# Patient Record
Sex: FEMALE | Race: WHITE | NOT HISPANIC OR LATINO | Employment: OTHER | URBAN - METROPOLITAN AREA
[De-identification: names, ages, dates, MRNs, and addresses within clinical notes are randomized per-mention and may not be internally consistent; named-entity substitution may affect disease eponyms.]

---

## 2023-10-10 PROBLEM — F33.9 MAJOR DEPRESSION, RECURRENT, CHRONIC (CMS-HCC): Status: ACTIVE | Noted: 2023-10-10

## 2023-10-10 PROBLEM — Q66.6: Status: ACTIVE | Noted: 2023-10-10

## 2023-10-10 PROBLEM — T78.40XA ALLERGIES: Status: ACTIVE | Noted: 2023-10-10

## 2023-10-10 RX ORDER — DIPHENHYDRAMINE HCL 25 MG
2 TABLET ORAL 2 TIMES DAILY
COMMUNITY
Start: 2023-08-07 | End: 2024-03-23 | Stop reason: ENTERED-IN-ERROR

## 2023-10-10 RX ORDER — BUPROPION HYDROCHLORIDE 75 MG/1
TABLET ORAL
COMMUNITY
Start: 2023-10-02 | End: 2024-03-23 | Stop reason: ENTERED-IN-ERROR

## 2023-10-10 RX ORDER — SERTRALINE HYDROCHLORIDE 100 MG/1
TABLET, FILM COATED ORAL
COMMUNITY
End: 2024-03-23 | Stop reason: ENTERED-IN-ERROR

## 2023-10-10 RX ORDER — DONEPEZIL HYDROCHLORIDE 5 MG/1
TABLET, FILM COATED ORAL
COMMUNITY
End: 2023-10-31 | Stop reason: SDUPTHER

## 2023-10-10 RX ORDER — LISINOPRIL 2.5 MG/1
TABLET ORAL
COMMUNITY
End: 2024-03-23 | Stop reason: ENTERED-IN-ERROR

## 2023-10-10 RX ORDER — PANTOPRAZOLE SODIUM 40 MG/1
TABLET, DELAYED RELEASE ORAL
COMMUNITY
End: 2024-03-23 | Stop reason: ENTERED-IN-ERROR

## 2023-10-10 RX ORDER — OMEPRAZOLE 40 MG/1
CAPSULE, DELAYED RELEASE ORAL
COMMUNITY
End: 2024-03-23 | Stop reason: ENTERED-IN-ERROR

## 2023-10-10 RX ORDER — SIMVASTATIN 20 MG/1
TABLET, FILM COATED ORAL
COMMUNITY
End: 2024-03-23 | Stop reason: ENTERED-IN-ERROR

## 2023-10-10 RX ORDER — AZELASTINE 1 MG/ML
SPRAY, METERED NASAL
COMMUNITY
Start: 2023-10-02 | End: 2024-03-23 | Stop reason: ENTERED-IN-ERROR

## 2023-10-11 ENCOUNTER — APPOINTMENT (OUTPATIENT)
Dept: NEUROLOGY | Facility: HOSPITAL | Age: 73
End: 2023-10-11
Payer: COMMERCIAL

## 2023-10-16 ENCOUNTER — OFFICE VISIT (OUTPATIENT)
Dept: PODIATRY | Facility: CLINIC | Age: 73
End: 2023-10-16
Payer: COMMERCIAL

## 2023-10-16 DIAGNOSIS — L90.9 FAT PAD ATROPHY OF FOOT: ICD-10-CM

## 2023-10-16 DIAGNOSIS — M20.42 HAMMERTOE, BILATERAL: Primary | ICD-10-CM

## 2023-10-16 DIAGNOSIS — M20.41 HAMMERTOE, BILATERAL: Primary | ICD-10-CM

## 2023-10-16 DIAGNOSIS — L85.3 XEROSIS CUTIS: ICD-10-CM

## 2023-10-16 PROCEDURE — 1160F RVW MEDS BY RX/DR IN RCRD: CPT | Performed by: PODIATRIST

## 2023-10-16 PROCEDURE — 1159F MED LIST DOCD IN RCRD: CPT | Performed by: PODIATRIST

## 2023-10-16 PROCEDURE — 99202 OFFICE O/P NEW SF 15 MIN: CPT | Performed by: PODIATRIST

## 2023-10-16 NOTE — PROGRESS NOTES
History Of Present Illness  Diomedes Taylor is a 73 y.o. female presenting with chief complaint of: Foot pain.  Patient is an avid walker.  She states that she has pain on the bottom of her feet and has developed painful calluses.  She states that she has had surgery in the past for her feet and has had her toes fused.     Past Medical History  She has no past medical history on file.    Surgical History  She has no past surgical history on file.     Social History  She has no history on file for tobacco use, alcohol use, and drug use.    Family History  No family history on file.     Allergies  Patient has no known allergies.    Medications  Current Outpatient Medications   Medication Sig Dispense Refill    azelastine (Astelin) 137 mcg (0.1 %) nasal spray Patient's comments: 2 times daily      buPROPion (Wellbutrin) 75 mg tablet Patient's comments: 2 times daily      diphenhydrAMINE (Sominex) 25 mg tablet Take 2 tablets (50 mg) by mouth 2 times a day. Patient reported      donepezil (Aricept) 5 mg tablet Patient's comments:    She only takes them when she thinks she needs them      lisinopril (ZestriL) 2.5 mg tablet Patient's comments:    She only takes them when she thinks she needs them      MULTIVITAMIN ORAL COMBINATION OF APPROX 5 VITAMINS      omeprazole (PriLOSEC) 40 mg DR capsule Patient's comments:    She only takes them when she thinks she needs them      pantoprazole (Protonix) 40 mg EC tablet Patient's comments:    She only takes them when she thinks she needs them      sertraline (Zoloft) 100 mg tablet Patient's comments:    She only takes them when she thinks she needs them      simvastatin (Zocor) 20 mg tablet Patient's comments:    She only takes them when she thinks she needs them       No current facility-administered medications for this visit.       Review of Systems    REVIEW OF SYSTEMS  GENERAL:  Negative for malaise, significant weight loss, fever  CARDIOVASCULAR: leg swelling   MUSCULOSKELETAL:   Negative for joint pain or swelling, back pain, and muscle pain.  SKIN:  Negative for lesions, rash, and itching  PSYCH:  Negative for sleep disturbance, mood disorder and recent psychosocial stressors  NEURO: Negative, denies any burning, tingling or numbness     Objective:   Vasc: DP and PT pulses are palpable bilateral.  CFT is less than 3 seconds bilateral.  Skin temperature is warm to cool proximal to distal bilateral.      Neuro:  Light touch is intact to the foot bilateral.  Protective sensation is intact to the foot when tested with the 5.07 SWM bilateral.  There is no clonus noted.  The hallux is downgoing bilateral.      Derm: Nails are normal. Skin is supple with normal texture and turgor noted.  Webspaces are clean, dry and intact bilateral.  There are calluses present some first and second metatarsal heads bilateral.  As well, callus present at the tip of the second toes bilateral..      Ortho: Muscle strength is 5/5 for all pedal groups tested.  Patient has atrophy of the plantar fat pad.  Patient has residual bunion deformity bilateral.  She has plantarflexed second metatarsal head.  She has digital clawing of the second toes bilateral.    Assessment/Plan     Diagnoses and all orders for this visit:  Hammertoe, bilateral  Xerosis cutis  Fat pad atrophy of foot      Patient has callus formation secondary to pedal deformity.  At home, patient can use a pumice stone to file down the callused areas.  She tried Kerasal OTC.       I spent 30 minutes in the professional and overall care of this patient.      Iris Rivsa DPM

## 2023-10-31 ENCOUNTER — OFFICE VISIT (OUTPATIENT)
Dept: NEUROLOGY | Facility: HOSPITAL | Age: 73
End: 2023-10-31
Payer: COMMERCIAL

## 2023-10-31 VITALS
HEIGHT: 64 IN | DIASTOLIC BLOOD PRESSURE: 71 MMHG | BODY MASS INDEX: 22.88 KG/M2 | TEMPERATURE: 97.5 F | RESPIRATION RATE: 18 BRPM | WEIGHT: 134 LBS | HEART RATE: 87 BPM | SYSTOLIC BLOOD PRESSURE: 136 MMHG

## 2023-10-31 DIAGNOSIS — G31.84 MILD COGNITIVE IMPAIRMENT: Primary | ICD-10-CM

## 2023-10-31 PROCEDURE — 1036F TOBACCO NON-USER: CPT | Performed by: PSYCHIATRY & NEUROLOGY

## 2023-10-31 PROCEDURE — 99205 OFFICE O/P NEW HI 60 MIN: CPT | Performed by: PSYCHIATRY & NEUROLOGY

## 2023-10-31 PROCEDURE — 99215 OFFICE O/P EST HI 40 MIN: CPT | Performed by: PSYCHIATRY & NEUROLOGY

## 2023-10-31 PROCEDURE — 99417 PROLNG OP E/M EACH 15 MIN: CPT | Performed by: PSYCHIATRY & NEUROLOGY

## 2023-10-31 PROCEDURE — 1126F AMNT PAIN NOTED NONE PRSNT: CPT | Performed by: PSYCHIATRY & NEUROLOGY

## 2023-10-31 PROCEDURE — 1160F RVW MEDS BY RX/DR IN RCRD: CPT | Performed by: PSYCHIATRY & NEUROLOGY

## 2023-10-31 PROCEDURE — G2112 PRED<=5 MG RA GLU <6M: HCPCS | Performed by: PSYCHIATRY & NEUROLOGY

## 2023-10-31 PROCEDURE — 1159F MED LIST DOCD IN RCRD: CPT | Performed by: PSYCHIATRY & NEUROLOGY

## 2023-10-31 RX ORDER — DONEPEZIL HYDROCHLORIDE 5 MG/1
5 TABLET, FILM COATED ORAL NIGHTLY
Qty: 30 TABLET | Refills: 3 | Status: SHIPPED | OUTPATIENT
Start: 2023-10-31 | End: 2024-02-20 | Stop reason: SDUPTHER

## 2023-10-31 ASSESSMENT — ENCOUNTER SYMPTOMS
DEPRESSION: 0
OCCASIONAL FEELINGS OF UNSTEADINESS: 0
LOSS OF SENSATION IN FEET: 0

## 2023-10-31 ASSESSMENT — PAIN SCALES - GENERAL: PAINLEVEL: 0-NO PAIN

## 2023-10-31 NOTE — PATIENT INSTRUCTIONS
You were seen today by Dr. Ramirez.  It is a pleasure seeing you.  Given the history and today's evaluation, I suspect mild cognitive impairment  Potential etiologies include Alzheimer's disease, vascular dementia, a multifactorial etiology can't be entirely ruled out.   Contributing factors include hearing loss, and longstanding history of depression.  We discussed the role of checking CSF biomarkers for Alzheimer's disease.  You are interested in research and I'll let our research team contact  you at [phone]        PLAN:  MRI brain wo contrast  Blood work to check for reversible causes of memory loss  CBT to help with hoarding and depression  I ordered audiology referral  Start donepezil 5 mg daily  Potential side effects include gastric upset, low heart rate and low blood pressure.  Follow-up in two months or earlier if needed.    General brain health guidelines:  - make sure your other medical conditions are well controlled (e.g., high blood pressure, high cholesterol, diabetes, sleep apnea etc)  - do not smoke or chew tobacco  - address any sensory deficits (e.g., proper glasses for poor eyesight, hearing aids for hearing loss)  - use a weekly pill box  - eat a heart healthy diet (e.g., lots of fruits and vegetables, low fat and cholesterol)  - exercise at least four days per week, 30 minutes at a time at an intensity that would make it difficult to converse with someone   - make sure you are getting at least 7 hours of quality sleep per night  - keep yourself mentally active daily by reading, playing cards, doing word searches, puzzles, etc.  - stay socially active by being part of a group or organization
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
## 2023-12-20 ENCOUNTER — LAB (OUTPATIENT)
Dept: LAB | Facility: LAB | Age: 73
End: 2023-12-20
Payer: COMMERCIAL

## 2023-12-20 ENCOUNTER — HOSPITAL ENCOUNTER (OUTPATIENT)
Dept: RADIOLOGY | Facility: CLINIC | Age: 73
Discharge: HOME | End: 2023-12-20
Payer: COMMERCIAL

## 2023-12-20 DIAGNOSIS — G31.84 MILD COGNITIVE IMPAIRMENT: ICD-10-CM

## 2023-12-20 DIAGNOSIS — G31.84 MILD COGNITIVE IMPAIRMENT: Primary | ICD-10-CM

## 2023-12-20 LAB
FOLATE SERPL-MCNC: >22.3 NG/ML
TSH SERPL-ACNC: 1.42 MIU/L (ref 0.44–3.98)
VIT B12 SERPL-MCNC: 175 PG/ML (ref 211–911)

## 2023-12-20 PROCEDURE — 82607 VITAMIN B-12: CPT

## 2023-12-20 PROCEDURE — 84443 ASSAY THYROID STIM HORMONE: CPT

## 2023-12-20 PROCEDURE — 36415 COLL VENOUS BLD VENIPUNCTURE: CPT

## 2023-12-20 PROCEDURE — 82746 ASSAY OF FOLIC ACID SERUM: CPT

## 2023-12-20 RX ORDER — LORAZEPAM 0.5 MG/1
1 TABLET ORAL ONCE
Status: DISCONTINUED | OUTPATIENT
Start: 2023-12-20 | End: 2024-01-04

## 2023-12-22 ENCOUNTER — TELEPHONE (OUTPATIENT)
Dept: NEUROLOGY | Facility: HOSPITAL | Age: 73
End: 2023-12-22
Payer: COMMERCIAL

## 2023-12-22 NOTE — TELEPHONE ENCOUNTER
I called the patient to return results of vitamin B 12 which came back low, sh didn't answer my call and I left a detailed message and called her son who answered my call, he mentioned that she will definitely prefer oral tablets, no known malabsorption, I recommended to take one tab daily 1000 mcg of cyanocobalamin, follow- up with PCP. After our discussion, the patient's son, Rolo articulated understanding of the plan and felt that all questions had been answered satisfactorily.

## 2023-12-26 DIAGNOSIS — E53.8 VITAMIN B12 DEFICIENCY: Primary | ICD-10-CM

## 2023-12-26 RX ORDER — LANOLIN ALCOHOL/MO/W.PET/CERES
1000 CREAM (GRAM) TOPICAL DAILY
Qty: 30 TABLET | Refills: 11 | Status: SHIPPED | OUTPATIENT
Start: 2023-12-26 | End: 2024-12-25

## 2024-01-02 ENCOUNTER — APPOINTMENT (OUTPATIENT)
Dept: NEUROLOGY | Facility: HOSPITAL | Age: 74
End: 2024-01-02
Payer: COMMERCIAL

## 2024-01-04 DIAGNOSIS — G31.84 MILD COGNITIVE IMPAIRMENT: Primary | ICD-10-CM

## 2024-01-04 RX ORDER — DIAZEPAM 5 MG/1
TABLET ORAL
Qty: 2 TABLET | Refills: 0 | Status: SHIPPED | OUTPATIENT
Start: 2024-01-04 | End: 2024-03-25 | Stop reason: HOSPADM

## 2024-01-24 ENCOUNTER — HOSPITAL ENCOUNTER (OUTPATIENT)
Dept: RADIOLOGY | Facility: CLINIC | Age: 74
Discharge: HOME | End: 2024-01-24
Payer: COMMERCIAL

## 2024-01-24 PROCEDURE — 70551 MRI BRAIN STEM W/O DYE: CPT | Performed by: RADIOLOGY

## 2024-01-24 PROCEDURE — 70551 MRI BRAIN STEM W/O DYE: CPT

## 2024-01-30 DIAGNOSIS — G31.84 MILD COGNITIVE IMPAIRMENT: ICD-10-CM

## 2024-01-30 RX ORDER — LORAZEPAM 0.5 MG/1
TABLET ORAL
Qty: 2 TABLET | Refills: 0 | Status: SHIPPED | OUTPATIENT
Start: 2024-01-30 | End: 2024-03-25 | Stop reason: HOSPADM

## 2024-02-08 ENCOUNTER — HOSPITAL ENCOUNTER (OUTPATIENT)
Dept: RADIOLOGY | Facility: HOSPITAL | Age: 74
Discharge: HOME | End: 2024-02-08
Payer: COMMERCIAL

## 2024-02-08 DIAGNOSIS — G31.84 MILD COGNITIVE IMPAIRMENT: ICD-10-CM

## 2024-02-08 PROCEDURE — A9575 INJ GADOTERATE MEGLUMI 0.1ML: HCPCS | Performed by: PSYCHIATRY & NEUROLOGY

## 2024-02-08 PROCEDURE — 70553 MRI BRAIN STEM W/O & W/DYE: CPT | Performed by: RADIOLOGY

## 2024-02-08 PROCEDURE — 70553 MRI BRAIN STEM W/O & W/DYE: CPT

## 2024-02-08 PROCEDURE — 2550000001 HC RX 255 CONTRASTS: Performed by: PSYCHIATRY & NEUROLOGY

## 2024-02-08 RX ORDER — GADOTERATE MEGLUMINE 376.9 MG/ML
12 INJECTION INTRAVENOUS
Status: COMPLETED | OUTPATIENT
Start: 2024-02-08 | End: 2024-02-08

## 2024-02-08 RX ADMIN — GADOTERATE MEGLUMINE 12 ML: 376.9 INJECTION INTRAVENOUS at 18:35

## 2024-02-20 ENCOUNTER — OFFICE VISIT (OUTPATIENT)
Dept: NEUROLOGY | Facility: HOSPITAL | Age: 74
End: 2024-02-20
Payer: COMMERCIAL

## 2024-02-20 VITALS
DIASTOLIC BLOOD PRESSURE: 76 MMHG | HEART RATE: 79 BPM | RESPIRATION RATE: 18 BRPM | WEIGHT: 132 LBS | HEIGHT: 66 IN | BODY MASS INDEX: 21.21 KG/M2 | SYSTOLIC BLOOD PRESSURE: 144 MMHG | TEMPERATURE: 97.3 F

## 2024-02-20 DIAGNOSIS — G31.84 MILD COGNITIVE IMPAIRMENT: ICD-10-CM

## 2024-02-20 DIAGNOSIS — F03.A0 MILD DEMENTIA WITHOUT BEHAVIORAL DISTURBANCE, PSYCHOTIC DISTURBANCE, MOOD DISTURBANCE, OR ANXIETY, UNSPECIFIED DEMENTIA TYPE (MULTI): Primary | ICD-10-CM

## 2024-02-20 PROCEDURE — 1126F AMNT PAIN NOTED NONE PRSNT: CPT | Performed by: PSYCHIATRY & NEUROLOGY

## 2024-02-20 PROCEDURE — 1036F TOBACCO NON-USER: CPT | Performed by: PSYCHIATRY & NEUROLOGY

## 2024-02-20 PROCEDURE — 99214 OFFICE O/P EST MOD 30 MIN: CPT | Performed by: PSYCHIATRY & NEUROLOGY

## 2024-02-20 RX ORDER — DONEPEZIL HYDROCHLORIDE 5 MG/1
5 TABLET, FILM COATED ORAL NIGHTLY
Qty: 90 TABLET | Refills: 3 | Status: SHIPPED | OUTPATIENT
Start: 2024-02-20 | End: 2025-02-19

## 2024-02-20 ASSESSMENT — PAIN SCALES - GENERAL: PAINLEVEL: 0-NO PAIN
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
## 2024-02-20 NOTE — PATIENT INSTRUCTIONS
You were seen today by Dr. Ramirez.  It is a pleasure seeing you.    Given the history and today's evaluation, I suspect mild dementia without behavioral disturbance  Potential etiologies include Alzheimer's disease, vascular dementia, contributing factors include vitamin B12 deficiency and hearing loss, a multifactorial etiology can't be entirely ruled out.   We discussed results of recent work-up including brain MRI and blood work.      PLAN:  Continue Aricept 5 mg daily  Continue vitamin B12 supplementation, follow-up with PCP regarding vitamin B12 levels  We didn't apply any changes to your current medications.  CBT to help with hoarding and depression  I recommended to optimize hearing  Follow-up in 6 months or earlier if needed.      Please call [phone] if you have any questions.     General brain health guidelines:  - make sure your other medical conditions are well controlled (e.g., high blood pressure, high cholesterol, diabetes, sleep apnea etc)  - do not smoke or chew tobacco  - address any sensory deficits (e.g., proper glasses for poor eyesight, hearing aids for hearing loss)  - use a weekly pill box  - eat a heart healthy diet (e.g., lots of fruits and vegetables, low fat and cholesterol)  - exercise at least four days per week, 30 minutes at a time at an intensity that would make it difficult to converse with someone   - make sure you are getting at least 7 hours of quality sleep per night  - keep yourself mentally active daily by reading, playing cards, doing word searches, puzzles, etc.  - stay socially active by being part of a group or organization     Here are more resources available for you :    a. Consultation with a  who specializes in cognitive decline in older adulthood. They can assist with counseling, educational resources, patient support groups, caregiver support groups, and preparation for future changes. Beth Wachter at [facility] ([address] [place] [address]  [address] in [place]; [phone]) could help with a referral, or she can contact the Alzheimer's Association 24-hour Helpline (1-330.504.2506).    b. Nena Lockwood at [facility] runs a caregiver training program that uses empirically based techniques to prepare and equip caregivers for the demands of that role when caring for someone with dementia.    c. Helpful resources for addressing the day-to-day challenges of cognitive dysfunction are offered at the Alzheimer's Association (now the Alzheimer's Disease and Related Disorders Association) website (www.alz.org) or by calling their 24-hour Helpline (1-207.285.6150).    d. The [employer] website also has helpful information: http://www.caregiver.org/caregiver/jsp/home.jsp

## 2024-03-20 ENCOUNTER — APPOINTMENT (OUTPATIENT)
Dept: AUDIOLOGY | Facility: CLINIC | Age: 74
End: 2024-03-20
Payer: COMMERCIAL

## 2024-03-23 ENCOUNTER — HOSPITAL ENCOUNTER (OUTPATIENT)
Facility: HOSPITAL | Age: 74
Setting detail: OBSERVATION
Discharge: HOME HEALTH | End: 2024-03-25
Attending: EMERGENCY MEDICINE | Admitting: INTERNAL MEDICINE
Payer: COMMERCIAL

## 2024-03-23 ENCOUNTER — APPOINTMENT (OUTPATIENT)
Dept: CARDIOLOGY | Facility: HOSPITAL | Age: 74
End: 2024-03-23
Payer: COMMERCIAL

## 2024-03-23 ENCOUNTER — APPOINTMENT (OUTPATIENT)
Dept: RADIOLOGY | Facility: HOSPITAL | Age: 74
End: 2024-03-23
Payer: COMMERCIAL

## 2024-03-23 DIAGNOSIS — F03.94 DEMENTIA WITH ANXIETY, UNSPECIFIED DEMENTIA SEVERITY, UNSPECIFIED DEMENTIA TYPE (MULTI): ICD-10-CM

## 2024-03-23 DIAGNOSIS — R41.82 ALTERED MENTAL STATUS, UNSPECIFIED ALTERED MENTAL STATUS TYPE: Primary | ICD-10-CM

## 2024-03-23 PROBLEM — F03.A0 UNSPECIFIED DEMENTIA, MILD, WITHOUT BEHAVIORAL DISTURBANCE, PSYCHOTIC DISTURBANCE, MOOD DISTURBANCE, AND ANXIETY (MULTI): Status: ACTIVE | Noted: 2024-02-20

## 2024-03-23 PROBLEM — T78.40XA ALLERGIES: Status: RESOLVED | Noted: 2023-10-10 | Resolved: 2024-03-23

## 2024-03-23 PROBLEM — I25.10 CORONARY ARTERY DISEASE INVOLVING NATIVE CORONARY ARTERY OF NATIVE HEART WITHOUT ANGINA PECTORIS: Status: ACTIVE | Noted: 2021-02-17

## 2024-03-23 PROBLEM — F32.A ANXIETY AND DEPRESSION: Status: ACTIVE | Noted: 2021-01-24

## 2024-03-23 PROBLEM — F41.9 ANXIETY AND DEPRESSION: Status: ACTIVE | Noted: 2021-01-24

## 2024-03-23 PROBLEM — I10 HTN (HYPERTENSION): Status: ACTIVE | Noted: 2024-03-23

## 2024-03-23 LAB
ALBUMIN SERPL BCP-MCNC: 3.8 G/DL (ref 3.4–5)
ALP SERPL-CCNC: 73 U/L (ref 33–136)
ALT SERPL W P-5'-P-CCNC: 12 U/L (ref 7–45)
AMMONIA PLAS-SCNC: 16 UMOL/L (ref 16–53)
ANION GAP SERPL CALC-SCNC: 11 MMOL/L (ref 10–20)
APPEARANCE UR: ABNORMAL
AST SERPL W P-5'-P-CCNC: 17 U/L (ref 9–39)
BASOPHILS # BLD AUTO: 0.02 X10*3/UL (ref 0–0.1)
BASOPHILS NFR BLD AUTO: 0.4 %
BILIRUB SERPL-MCNC: 0.6 MG/DL (ref 0–1.2)
BILIRUB UR STRIP.AUTO-MCNC: NEGATIVE MG/DL
BUN SERPL-MCNC: 16 MG/DL (ref 6–23)
CALCIUM SERPL-MCNC: 8.8 MG/DL (ref 8.6–10.3)
CAOX CRY #/AREA UR COMP ASSIST: ABNORMAL /HPF
CARDIAC TROPONIN I PNL SERPL HS: 3 NG/L (ref 0–13)
CHLORIDE SERPL-SCNC: 103 MMOL/L (ref 98–107)
CHOLEST SERPL-MCNC: 166 MG/DL (ref 0–199)
CHOLESTEROL/HDL RATIO: 2.2
CO2 SERPL-SCNC: 28 MMOL/L (ref 21–32)
COLOR UR: ABNORMAL
CREAT SERPL-MCNC: 0.96 MG/DL (ref 0.5–1.05)
EGFRCR SERPLBLD CKD-EPI 2021: 62 ML/MIN/1.73M*2
EOSINOPHIL # BLD AUTO: 0.05 X10*3/UL (ref 0–0.4)
EOSINOPHIL NFR BLD AUTO: 0.9 %
ERYTHROCYTE [DISTWIDTH] IN BLOOD BY AUTOMATED COUNT: 12 % (ref 11.5–14.5)
ETHANOL SERPL-MCNC: <10 MG/DL
GLUCOSE BLD MANUAL STRIP-MCNC: 144 MG/DL (ref 74–99)
GLUCOSE BLD MANUAL STRIP-MCNC: 160 MG/DL (ref 74–99)
GLUCOSE SERPL-MCNC: 99 MG/DL (ref 74–99)
GLUCOSE UR STRIP.AUTO-MCNC: NEGATIVE MG/DL
HCT VFR BLD AUTO: 37.4 % (ref 36–46)
HDLC SERPL-MCNC: 75.5 MG/DL
HGB BLD-MCNC: 12.2 G/DL (ref 12–16)
IMM GRANULOCYTES # BLD AUTO: 0.01 X10*3/UL (ref 0–0.5)
IMM GRANULOCYTES NFR BLD AUTO: 0.2 % (ref 0–0.9)
INR PPP: 1.1 (ref 0.9–1.1)
KETONES UR STRIP.AUTO-MCNC: NEGATIVE MG/DL
LDLC SERPL CALC-MCNC: 76 MG/DL
LEUKOCYTE ESTERASE UR QL STRIP.AUTO: NEGATIVE
LYMPHOCYTES # BLD AUTO: 0.46 X10*3/UL (ref 0.8–3)
LYMPHOCYTES NFR BLD AUTO: 8.6 %
MCH RBC QN AUTO: 30.7 PG (ref 26–34)
MCHC RBC AUTO-ENTMCNC: 32.6 G/DL (ref 32–36)
MCV RBC AUTO: 94 FL (ref 80–100)
MONOCYTES # BLD AUTO: 0.3 X10*3/UL (ref 0.05–0.8)
MONOCYTES NFR BLD AUTO: 5.6 %
MUCOUS THREADS #/AREA URNS AUTO: ABNORMAL /LPF
NEUTROPHILS # BLD AUTO: 4.54 X10*3/UL (ref 1.6–5.5)
NEUTROPHILS NFR BLD AUTO: 84.3 %
NITRITE UR QL STRIP.AUTO: NEGATIVE
NON HDL CHOLESTEROL: 91 MG/DL (ref 0–149)
NRBC BLD-RTO: 0 /100 WBCS (ref 0–0)
PH UR STRIP.AUTO: 5 [PH]
PLATELET # BLD AUTO: 170 X10*3/UL (ref 150–450)
POTASSIUM SERPL-SCNC: 3.8 MMOL/L (ref 3.5–5.3)
PROT SERPL-MCNC: 6.4 G/DL (ref 6.4–8.2)
PROT UR STRIP.AUTO-MCNC: ABNORMAL MG/DL
PROTHROMBIN TIME: 12.6 SECONDS (ref 9.8–12.8)
RBC # BLD AUTO: 3.98 X10*6/UL (ref 4–5.2)
RBC # UR STRIP.AUTO: NEGATIVE /UL
RBC #/AREA URNS AUTO: ABNORMAL /HPF
SODIUM SERPL-SCNC: 138 MMOL/L (ref 136–145)
SP GR UR STRIP.AUTO: 1.03
SQUAMOUS #/AREA URNS AUTO: ABNORMAL /HPF
TRIGL SERPL-MCNC: 73 MG/DL (ref 0–149)
TSH SERPL-ACNC: 1.44 MIU/L (ref 0.44–3.98)
UROBILINOGEN UR STRIP.AUTO-MCNC: <2 MG/DL
VLDL: 15 MG/DL (ref 0–40)
WBC # BLD AUTO: 5.4 X10*3/UL (ref 4.4–11.3)
WBC #/AREA URNS AUTO: ABNORMAL /HPF

## 2024-03-23 PROCEDURE — 81001 URINALYSIS AUTO W/SCOPE: CPT

## 2024-03-23 PROCEDURE — 84484 ASSAY OF TROPONIN QUANT: CPT

## 2024-03-23 PROCEDURE — 82746 ASSAY OF FOLIC ACID SERUM: CPT | Mod: STJLAB | Performed by: EMERGENCY MEDICINE

## 2024-03-23 PROCEDURE — G0378 HOSPITAL OBSERVATION PER HR: HCPCS

## 2024-03-23 PROCEDURE — 36415 COLL VENOUS BLD VENIPUNCTURE: CPT | Performed by: NURSE PRACTITIONER

## 2024-03-23 PROCEDURE — 99222 1ST HOSP IP/OBS MODERATE 55: CPT | Performed by: NURSE PRACTITIONER

## 2024-03-23 PROCEDURE — 93005 ELECTROCARDIOGRAM TRACING: CPT

## 2024-03-23 PROCEDURE — 99285 EMERGENCY DEPT VISIT HI MDM: CPT | Performed by: EMERGENCY MEDICINE

## 2024-03-23 PROCEDURE — 70450 CT HEAD/BRAIN W/O DYE: CPT | Performed by: RADIOLOGY

## 2024-03-23 PROCEDURE — 83036 HEMOGLOBIN GLYCOSYLATED A1C: CPT | Mod: STJLAB | Performed by: NURSE PRACTITIONER

## 2024-03-23 PROCEDURE — 82140 ASSAY OF AMMONIA: CPT | Performed by: NURSE PRACTITIONER

## 2024-03-23 PROCEDURE — 36415 COLL VENOUS BLD VENIPUNCTURE: CPT

## 2024-03-23 PROCEDURE — 99285 EMERGENCY DEPT VISIT HI MDM: CPT | Mod: 25

## 2024-03-23 PROCEDURE — 82947 ASSAY GLUCOSE BLOOD QUANT: CPT | Mod: 59

## 2024-03-23 PROCEDURE — 2500000001 HC RX 250 WO HCPCS SELF ADMINISTERED DRUGS (ALT 637 FOR MEDICARE OP): Performed by: NURSE PRACTITIONER

## 2024-03-23 PROCEDURE — 93010 ELECTROCARDIOGRAM REPORT: CPT | Performed by: INTERNAL MEDICINE

## 2024-03-23 PROCEDURE — 2500000002 HC RX 250 W HCPCS SELF ADMINISTERED DRUGS (ALT 637 FOR MEDICARE OP, ALT 636 FOR OP/ED): Performed by: NURSE PRACTITIONER

## 2024-03-23 PROCEDURE — 96372 THER/PROPH/DIAG INJ SC/IM: CPT | Mod: ZK | Performed by: NURSE PRACTITIONER

## 2024-03-23 PROCEDURE — 85610 PROTHROMBIN TIME: CPT

## 2024-03-23 PROCEDURE — 2500000004 HC RX 250 GENERAL PHARMACY W/ HCPCS (ALT 636 FOR OP/ED): Performed by: NURSE PRACTITIONER

## 2024-03-23 PROCEDURE — 84443 ASSAY THYROID STIM HORMONE: CPT | Performed by: NURSE PRACTITIONER

## 2024-03-23 PROCEDURE — 93010 ELECTROCARDIOGRAM REPORT: CPT | Performed by: EMERGENCY MEDICINE

## 2024-03-23 PROCEDURE — 80061 LIPID PANEL: CPT | Performed by: NURSE PRACTITIONER

## 2024-03-23 PROCEDURE — 80053 COMPREHEN METABOLIC PANEL: CPT

## 2024-03-23 PROCEDURE — 82607 VITAMIN B-12: CPT | Mod: STJLAB | Performed by: EMERGENCY MEDICINE

## 2024-03-23 PROCEDURE — 70450 CT HEAD/BRAIN W/O DYE: CPT

## 2024-03-23 PROCEDURE — 82077 ASSAY SPEC XCP UR&BREATH IA: CPT

## 2024-03-23 PROCEDURE — 85025 COMPLETE CBC W/AUTO DIFF WBC: CPT

## 2024-03-23 PROCEDURE — 99221 1ST HOSP IP/OBS SF/LOW 40: CPT | Performed by: PSYCHIATRY & NEUROLOGY

## 2024-03-23 RX ORDER — ATORVASTATIN CALCIUM 80 MG/1
80 TABLET, FILM COATED ORAL NIGHTLY
Status: DISCONTINUED | OUTPATIENT
Start: 2024-03-23 | End: 2024-03-25 | Stop reason: HOSPADM

## 2024-03-23 RX ORDER — LISINOPRIL 2.5 MG/1
2.5 TABLET ORAL DAILY
COMMUNITY

## 2024-03-23 RX ORDER — SERTRALINE HYDROCHLORIDE 100 MG/1
100 TABLET, FILM COATED ORAL DAILY
COMMUNITY

## 2024-03-23 RX ORDER — BUPROPION HYDROCHLORIDE 75 MG/1
75 TABLET ORAL 2 TIMES DAILY
COMMUNITY

## 2024-03-23 RX ORDER — HYDRALAZINE HYDROCHLORIDE 25 MG/1
25 TABLET, FILM COATED ORAL EVERY 6 HOURS PRN
Status: DISCONTINUED | OUTPATIENT
Start: 2024-03-25 | End: 2024-03-25 | Stop reason: HOSPADM

## 2024-03-23 RX ORDER — POLYETHYLENE GLYCOL 3350 17 G/17G
17 POWDER, FOR SOLUTION ORAL DAILY
Status: DISCONTINUED | OUTPATIENT
Start: 2024-03-23 | End: 2024-03-25 | Stop reason: HOSPADM

## 2024-03-23 RX ORDER — ASPIRIN 81 MG/1
81 TABLET ORAL DAILY
Status: DISCONTINUED | OUTPATIENT
Start: 2024-03-24 | End: 2024-03-25 | Stop reason: HOSPADM

## 2024-03-23 RX ORDER — TALC
3 POWDER (GRAM) TOPICAL NIGHTLY PRN
Status: DISCONTINUED | OUTPATIENT
Start: 2024-03-23 | End: 2024-03-25 | Stop reason: HOSPADM

## 2024-03-23 RX ORDER — HYDRALAZINE HYDROCHLORIDE 20 MG/ML
10 INJECTION INTRAMUSCULAR; INTRAVENOUS
Status: DISCONTINUED | OUTPATIENT
Start: 2024-03-23 | End: 2024-03-25 | Stop reason: HOSPADM

## 2024-03-23 RX ORDER — SERTRALINE HYDROCHLORIDE 100 MG/1
100 TABLET, FILM COATED ORAL DAILY
Status: DISCONTINUED | OUTPATIENT
Start: 2024-03-24 | End: 2024-03-25 | Stop reason: HOSPADM

## 2024-03-23 RX ORDER — BUPROPION HYDROCHLORIDE 75 MG/1
75 TABLET ORAL 2 TIMES DAILY
Status: DISCONTINUED | OUTPATIENT
Start: 2024-03-23 | End: 2024-03-25 | Stop reason: HOSPADM

## 2024-03-23 RX ORDER — ENOXAPARIN SODIUM 100 MG/ML
40 INJECTION SUBCUTANEOUS EVERY 24 HOURS
Status: DISCONTINUED | OUTPATIENT
Start: 2024-03-23 | End: 2024-03-25 | Stop reason: HOSPADM

## 2024-03-23 RX ORDER — DONEPEZIL HYDROCHLORIDE 5 MG/1
5 TABLET, FILM COATED ORAL NIGHTLY
Status: DISCONTINUED | OUTPATIENT
Start: 2024-03-23 | End: 2024-03-25 | Stop reason: HOSPADM

## 2024-03-23 RX ORDER — NAPROXEN SODIUM 220 MG/1
324 TABLET, FILM COATED ORAL ONCE
Status: COMPLETED | OUTPATIENT
Start: 2024-03-23 | End: 2024-03-23

## 2024-03-23 RX ORDER — LISINOPRIL 2.5 MG/1
2.5 TABLET ORAL DAILY
Status: DISCONTINUED | OUTPATIENT
Start: 2024-03-24 | End: 2024-03-25 | Stop reason: HOSPADM

## 2024-03-23 RX ORDER — LABETALOL HYDROCHLORIDE 5 MG/ML
10 INJECTION, SOLUTION INTRAVENOUS EVERY 10 MIN PRN
Status: DISCONTINUED | OUTPATIENT
Start: 2024-03-23 | End: 2024-03-25 | Stop reason: HOSPADM

## 2024-03-23 RX ORDER — QUETIAPINE FUMARATE 25 MG/1
12.5 TABLET, FILM COATED ORAL ONCE
Status: COMPLETED | OUTPATIENT
Start: 2024-03-23 | End: 2024-03-23

## 2024-03-23 RX ORDER — LANOLIN ALCOHOL/MO/W.PET/CERES
1000 CREAM (GRAM) TOPICAL DAILY
Status: DISCONTINUED | OUTPATIENT
Start: 2024-03-24 | End: 2024-03-25 | Stop reason: HOSPADM

## 2024-03-23 RX ORDER — SIMVASTATIN 40 MG/1
40 TABLET, FILM COATED ORAL NIGHTLY
COMMUNITY

## 2024-03-23 RX ADMIN — ENOXAPARIN SODIUM 40 MG: 40 INJECTION SUBCUTANEOUS at 16:42

## 2024-03-23 RX ADMIN — ATORVASTATIN CALCIUM 80 MG: 80 TABLET, FILM COATED ORAL at 21:31

## 2024-03-23 RX ADMIN — QUETIAPINE FUMARATE 12.5 MG: 25 TABLET ORAL at 22:54

## 2024-03-23 RX ADMIN — BUPROPION HYDROCHLORIDE 75 MG: 75 TABLET, FILM COATED ORAL at 22:54

## 2024-03-23 RX ADMIN — DONEPEZIL HYDROCHLORIDE 5 MG: 5 TABLET ORAL at 22:54

## 2024-03-23 RX ADMIN — ASPIRIN 81 MG 324 MG: 81 TABLET ORAL at 16:42

## 2024-03-23 SDOH — SOCIAL STABILITY: SOCIAL INSECURITY: ARE YOU OR HAVE YOU BEEN THREATENED OR ABUSED PHYSICALLY, EMOTIONALLY, OR SEXUALLY BY ANYONE?: NO

## 2024-03-23 SDOH — SOCIAL STABILITY: SOCIAL INSECURITY: ABUSE: ADULT

## 2024-03-23 SDOH — SOCIAL STABILITY: SOCIAL INSECURITY: DOES ANYONE TRY TO KEEP YOU FROM HAVING/CONTACTING OTHER FRIENDS OR DOING THINGS OUTSIDE YOUR HOME?: NO

## 2024-03-23 SDOH — SOCIAL STABILITY: SOCIAL INSECURITY: HAS ANYONE EVER THREATENED TO HURT YOUR FAMILY OR YOUR PETS?: NO

## 2024-03-23 SDOH — SOCIAL STABILITY: SOCIAL INSECURITY: DO YOU FEEL UNSAFE GOING BACK TO THE PLACE WHERE YOU ARE LIVING?: NO

## 2024-03-23 SDOH — SOCIAL STABILITY: SOCIAL INSECURITY: ARE THERE ANY APPARENT SIGNS OF INJURIES/BEHAVIORS THAT COULD BE RELATED TO ABUSE/NEGLECT?: NO

## 2024-03-23 SDOH — SOCIAL STABILITY: SOCIAL INSECURITY: DO YOU FEEL ANYONE HAS EXPLOITED OR TAKEN ADVANTAGE OF YOU FINANCIALLY OR OF YOUR PERSONAL PROPERTY?: NO

## 2024-03-23 SDOH — SOCIAL STABILITY: SOCIAL INSECURITY: HAVE YOU HAD THOUGHTS OF HARMING ANYONE ELSE?: NO

## 2024-03-23 SDOH — SOCIAL STABILITY: SOCIAL INSECURITY: WERE YOU ABLE TO COMPLETE ALL THE BEHAVIORAL HEALTH SCREENINGS?: YES

## 2024-03-23 ASSESSMENT — COLUMBIA-SUICIDE SEVERITY RATING SCALE - C-SSRS
2. HAVE YOU ACTUALLY HAD ANY THOUGHTS OF KILLING YOURSELF?: NO
6. HAVE YOU EVER DONE ANYTHING, STARTED TO DO ANYTHING, OR PREPARED TO DO ANYTHING TO END YOUR LIFE?: NO
1. IN THE PAST MONTH, HAVE YOU WISHED YOU WERE DEAD OR WISHED YOU COULD GO TO SLEEP AND NOT WAKE UP?: NO

## 2024-03-23 ASSESSMENT — ENCOUNTER SYMPTOMS
DYSURIA: 0
SHORTNESS OF BREATH: 0
SORE THROAT: 0
MUSCULOSKELETAL NEGATIVE: 1
ABDOMINAL PAIN: 0
SINUS PRESSURE: 1
CONSTIPATION: 0
HEMATURIA: 0
WEAKNESS: 0
NAUSEA: 0
NUMBNESS: 0
PALPITATIONS: 0
SPEECH DIFFICULTY: 0
CONFUSION: 1
APPETITE CHANGE: 0
COUGH: 0
FEVER: 0
UNEXPECTED WEIGHT CHANGE: 1
DIARRHEA: 0
FACIAL ASYMMETRY: 0
SLEEP DISTURBANCE: 0
ACTIVITY CHANGE: 0
VOMITING: 0
FLANK PAIN: 0
HEADACHES: 0
CHILLS: 0
WOUND: 0
DIZZINESS: 0
ENDOCRINE NEGATIVE: 1
HALLUCINATIONS: 0
FREQUENCY: 0
WHEEZING: 0
LIGHT-HEADEDNESS: 0
TROUBLE SWALLOWING: 0
EYES NEGATIVE: 1
FATIGUE: 1

## 2024-03-23 ASSESSMENT — ACTIVITIES OF DAILY LIVING (ADL)
ADEQUATE_TO_COMPLETE_ADL: NO
FEEDING YOURSELF: NEEDS ASSISTANCE
BATHING: NEEDS ASSISTANCE
WALKS IN HOME: NEEDS ASSISTANCE
DRESSING YOURSELF: NEEDS ASSISTANCE
LACK_OF_TRANSPORTATION: PATIENT DECLINED
JUDGMENT_ADEQUATE_SAFELY_COMPLETE_DAILY_ACTIVITIES: NO
PATIENT'S MEMORY ADEQUATE TO SAFELY COMPLETE DAILY ACTIVITIES?: NO
HEARING - LEFT EAR: FUNCTIONAL
GROOMING: NEEDS ASSISTANCE
TOILETING: NEEDS ASSISTANCE
HEARING - RIGHT EAR: FUNCTIONAL

## 2024-03-23 ASSESSMENT — LIFESTYLE VARIABLES
HAVE YOU EVER FELT YOU SHOULD CUT DOWN ON YOUR DRINKING: NO
TOTAL SCORE: 0
AUDIT-C TOTAL SCORE: 0
HOW OFTEN DO YOU HAVE 6 OR MORE DRINKS ON ONE OCCASION: NEVER
HOW MANY STANDARD DRINKS CONTAINING ALCOHOL DO YOU HAVE ON A TYPICAL DAY: PATIENT DOES NOT DRINK
AUDIT-C TOTAL SCORE: 0
EVER HAD A DRINK FIRST THING IN THE MORNING TO STEADY YOUR NERVES TO GET RID OF A HANGOVER: NO
EVER FELT BAD OR GUILTY ABOUT YOUR DRINKING: NO
HOW OFTEN DO YOU HAVE A DRINK CONTAINING ALCOHOL: NEVER
HAVE PEOPLE ANNOYED YOU BY CRITICIZING YOUR DRINKING: NO
SKIP TO QUESTIONS 9-10: 1

## 2024-03-23 ASSESSMENT — COGNITIVE AND FUNCTIONAL STATUS - GENERAL
MOVING FROM LYING ON BACK TO SITTING ON SIDE OF FLAT BED WITH BEDRAILS: A LITTLE
PATIENT BASELINE BEDBOUND: NO
TURNING FROM BACK TO SIDE WHILE IN FLAT BAD: A LITTLE
MOVING TO AND FROM BED TO CHAIR: A LITTLE
EATING MEALS: A LITTLE
DAILY ACTIVITIY SCORE: 18
CLIMB 3 TO 5 STEPS WITH RAILING: A LITTLE
DRESSING REGULAR UPPER BODY CLOTHING: A LITTLE
STANDING UP FROM CHAIR USING ARMS: A LITTLE
TOILETING: A LITTLE
DRESSING REGULAR LOWER BODY CLOTHING: A LITTLE
HELP NEEDED FOR BATHING: A LITTLE
WALKING IN HOSPITAL ROOM: A LITTLE
PERSONAL GROOMING: A LITTLE
MOBILITY SCORE: 18

## 2024-03-23 ASSESSMENT — PAIN - FUNCTIONAL ASSESSMENT
PAIN_FUNCTIONAL_ASSESSMENT: 0-10
PAIN_FUNCTIONAL_ASSESSMENT: 0-10

## 2024-03-23 ASSESSMENT — PATIENT HEALTH QUESTIONNAIRE - PHQ9
SUM OF ALL RESPONSES TO PHQ9 QUESTIONS 1 & 2: 0
1. LITTLE INTEREST OR PLEASURE IN DOING THINGS: NOT AT ALL
2. FEELING DOWN, DEPRESSED OR HOPELESS: NOT AT ALL

## 2024-03-23 ASSESSMENT — PAIN SCALES - GENERAL
PAINLEVEL_OUTOF10: 0 - NO PAIN
PAINLEVEL_OUTOF10: 0 - NO PAIN

## 2024-03-23 NOTE — ED PROVIDER NOTES
"HPI   Chief Complaint   Patient presents with    Patient reports \"brain fog\" hx alzheimers       Patient is a 73-year-old female with Alzheimer's dementia, coronary artery disease, hypertension, depression presenting to the emergency department for confusion.  Patient lives at home alone.  She called her brother today telling him that her brain feels foggy and that there is a disconnect.  Her brother went over to check in on her and she seemed confused, repeating herself multiple times.  She does seem improved now, but she has no recollection of calling her brother at all.  She is not sure why she is here.  She still has what she describes as fogginess and dizziness that she describes the sensation of being drunk.  She denies any vertigo.  This is never happened for her before.  She denies any other symptoms such as chest pain, shortness of breath, nausea or vomiting, abdominal pain, dysuria, headache, vision changes.      History provided by:  Patient, medical records and relative                      [place] Coma Scale Score: 15         NIH Stroke Scale: 0             Patient History   History reviewed. No pertinent past medical history.  History reviewed. No pertinent surgical history.  No family history on file.  Social History     Tobacco Use    Smoking status: Never    Smokeless tobacco: Never   Substance Use Topics    Alcohol use: Yes     Comment: occassionally    Drug use: Never       Physical Exam   ED Triage Vitals [03/23/24 1226]   Temperature Heart Rate Respirations BP   36.3 °C (97.3 °F) 68 18 113/54      Pulse Ox Temp Source Heart Rate Source Patient Position   99 % Temporal -- --      BP Location FiO2 (%)     -- --       Physical Exam  Vitals and nursing note reviewed.   Constitutional:       General: She is not in acute distress.     Appearance: She is well-developed.   HENT:      Head: Normocephalic and atraumatic.      Right Ear: External ear normal.      Left Ear: External ear normal.      Nose: " Nose normal.      Mouth/Throat:      Mouth: Mucous membranes are moist.   Eyes:      General: No scleral icterus.     Extraocular Movements: Extraocular movements intact.      Conjunctiva/sclera: Conjunctivae normal.      Pupils: Pupils are equal, round, and reactive to light.   Cardiovascular:      Rate and Rhythm: Normal rate and regular rhythm.      Heart sounds: No murmur heard.  Pulmonary:      Effort: Pulmonary effort is normal. No respiratory distress.      Breath sounds: Normal breath sounds.   Abdominal:      Palpations: Abdomen is soft.      Tenderness: There is no abdominal tenderness.   Musculoskeletal:         General: No swelling.      Cervical back: Neck supple.   Skin:     General: Skin is warm and dry.   Neurological:      General: No focal deficit present.      Mental Status: She is alert and oriented to person, place, and time.      Cranial Nerves: No cranial nerve deficit.      Sensory: No sensory deficit.      Motor: No weakness.      Coordination: Coordination normal.      Comments: 5/5 strength in extremities throughout.  Normal finger-nose, heel-to-shin testing.  No ataxia on ambulation.   Psychiatric:         Mood and Affect: Mood normal.         ED Course & MDM   Diagnoses as of 03/23/24 1451   Altered mental status, unspecified altered mental status type       Medical Decision Making  Patient is a 73-year-old female with Alzheimer's dementia presenting to the emergency department for confusion.  On arrival, she is alert and oriented to place, self, month and year.  However, she does not remember why she is here.  She does not have any focal neurologic deficits, no ataxia on ambulation.  She is afebrile hemodynamically stable.  Has no complaints besides feeling as if she is foggy.  She describes his dizziness as feeling drunk or in a dream, denying vertigo or lightheadedness.  We obtained CT imaging of her head as well as lab work including ethanol level as the patient is having significant  difficulty remembering short-term events.  Her brother is here and patient does recognize her brother at this time, but did not recognize him earlier in the day.    CT head wo IV contrast   Final Result    No evidence of acute cortical infarct or intracranial hemorrhage.          No evidence of intracranial hemorrhage or displaced skull fracture.          MACRO:    None          Signed by: Joseph Schoenberger 3/23/2024 2:21 PM    Dictation workstation:   [id number]        Results for orders placed or performed during the hospital encounter of 03/23/24  -Urinalysis with Reflex Culture and Microscopic:        Result                      Value             Ref Range           Color, Urine                Fay (N)         Straw, Yellow       Appearance, Urine           Hazy (N)          Clear               Specific Gravity, Urine     1.028             1.005 - 1.035       pH, Urine                   5.0               5.0, 5.5, 6.*       Protein, Urine              30 (1+) (N)       NEGATIVE mg/*       Glucose, Urine              NEGATIVE          NEGATIVE mg/*       Blood, Urine                NEGATIVE          NEGATIVE            Ketones, Urine              NEGATIVE          NEGATIVE mg/*       Bilirubin, Urine            NEGATIVE          NEGATIVE            Urobilinogen, Urine         <2.0              <2.0 mg/dL          Nitrite, Urine              NEGATIVE          NEGATIVE            Leukocyte Esterase, Ur*     NEGATIVE          NEGATIVE       -CBC and Auto Differential:        Result                      Value             Ref Range           WBC                         5.4               4.4 - 11.3 x*       nRBC                        0.0               0.0 - 0.0 /1*       RBC                         3.98 (L)          4.00 - 5.20 *       Hemoglobin                  12.2              12.0 - 16.0 *       Hematocrit                  37.4              36.0 - 46.0 %       MCV                         94                80  - 100 fL         MCH                         30.7              26.0 - 34.0 *       MCHC                        32.6              32.0 - 36.0 *       RDW                         12.0              11.5 - 14.5 %       Platelets                   170               150 - 450 x1*       Neutrophils %               84.3              40.0 - 80.0 %       Immature Granulocytes *     0.2               0.0 - 0.9 %         Lymphocytes %               8.6               13.0 - 44.0 %       Monocytes %                 5.6               2.0 - 10.0 %        Eosinophils %               0.9               0.0 - 6.0 %         Basophils %                 0.4               0.0 - 2.0 %         Neutrophils Absolute        4.54              1.60 - 5.50 *       Immature Granulocytes *     0.01              0.00 - 0.50 *       Lymphocytes Absolute        0.46 (L)          0.80 - 3.00 *       Monocytes Absolute          0.30              0.05 - 0.80 *       Eosinophils Absolute        0.05              0.00 - 0.40 *       Basophils Absolute          0.02              0.00 - 0.10 *  -Comprehensive metabolic panel:        Result                      Value             Ref Range           Glucose                     99                74 - 99 mg/dL       Sodium                      138               136 - 145 mm*       Potassium                   3.8               3.5 - 5.3 mm*       Chloride                    103               98 - 107 mmo*       Bicarbonate                 28                21 - 32 mmol*       Anion Gap                   11                10 - 20 mmol*       Urea Nitrogen               16                6 - 23 mg/dL        Creatinine                  0.96              0.50 - 1.05 *       eGFR                        62                >60 mL/min/1*       Calcium                     8.8               8.6 - 10.3 m*       Albumin                     3.8               3.4 - 5.0 g/*       Alkaline Phosphatase        73                33 -  136 U/L        Total Protein               6.4               6.4 - 8.2 g/*       AST                         17                9 - 39 U/L          Bilirubin, Total            0.6               0.0 - 1.2 mg*       ALT                         12                7 - 45 U/L     -Troponin I, High Sensitivity:        Result                      Value             Ref Range           Troponin I, High Sensi*     3                 0 - 13 ng/L    -Protime-INR:        Result                      Value             Ref Range           Protime                     12.6              9.8 - 12.8 s*       INR                         1.1               0.9 - 1.1      -Ethanol:        Result                      Value             Ref Range           Alcohol                     <10               <=10 mg/dL     -Urinalysis Microscopic:        Result                      Value             Ref Range           WBC, Urine                  1-5               1-5, NONE /H*       RBC, Urine                  NONE              NONE, 1-2, 3*       Squamous Epithelial Ce*     1-9 (SPARSE)      Reference ra*       Mucus, Urine                1+                Reference ra*       Calcium Oxalate Airam*     4+ (A)            NONE, 1+ /HPF     CT imaging of the head is unremarkable.  CBC negative for leukocytosis or anemia.  CMP within normal limits.  Troponin normal.  Urinalysis negative for signs of infection.  Ethanol level negative.    Findings were discussed with the patient and her brother.  Patient still having significant short-term memory loss, asking multiple times how and why she came to the hospital.  She is still experiencing some of this brain fogginess.  Patient lives alone and does not receive any home health care or other services besides being checked on by her brother.  There is concern for her safety and ability to care for self at home alone.  We discussed placement at a memory care facility.  They have discussed this amongst  themselves in the past and they are open to this.  They our familiar with the [facility] as well as [facility] on [place].  They are agreeable for admission and placement.  This was discussed with Dr. Shorty Yeung, patient's primary care physician.  He has accepted the patient to the medicine service.    Trenton Brizuela DO, PGY 3  Emergency Medicine Resident    Amount and/or Complexity of Data Reviewed  Labs: ordered.  Radiology: ordered.  ECG/medicine tests: ordered and independent interpretation performed. Decision-making details documented in ED Course.     Details: EKG at 1354 on 3/23/2024 as interpreted by myself: Ventricular rate 53, , , QTc 473.  Sinus bradycardia.  Right bundle branch block.  No acute injury pattern.        Procedure  Procedures     Trenton Brizuela DO  Resident  03/23/24 1453       Trenton Brizuela DO  Resident  03/23/24 1910

## 2024-03-23 NOTE — CONSULTS
Inpatient consult to Neurology  Consult performed by: John Coles MD  Consult ordered by: CHARISMA Motnez-ISRAEL          History Of Present Illness  Diomedes Taylor is a 73 y.o. female presenting with confusion.    The patient has baseline Dementia.  Per chart review, apparently, she called her brother and told him that she felt mentally foggy.  Her brother came to her house and noted that the patient was repeating herself quite a bit.  She was brought to the ER.  Currently, the patient's only complaint is feeling tired.  The patient denies any double vision, loss of peripheral vision, slurred speech, difficulty getting the words out, facial droop, facial numbness, focal weakness, focal numbness, loss of coordination, or loss of balance.      Past Medical History  Past Medical History:   Diagnosis Date    Anxiety and depression 01/24/2021    Coronary artery disease involving native coronary artery of native heart without angina pectoris 02/17/2021    HTN (hypertension) 03/23/2024    Major depression, recurrent, chronic (CMS/HCC) 10/10/2023     Surgical History  History reviewed. No pertinent surgical history.  Social History  Social History     Tobacco Use    Smoking status: Never    Smokeless tobacco: Never   Substance Use Topics    Alcohol use: Yes     Comment: occassionally    Drug use: Never     Allergies  Patient has no known allergies.  Medications Prior to Admission   Medication Sig Dispense Refill Last Dose    buPROPion (Wellbutrin) 75 mg tablet Take 1 tablet (75 mg) by mouth 2 times a day.   Unknown    cyanocobalamin (Vitamin B-12) 1,000 mcg tablet Take 1 tablet (1,000 mcg) by mouth once daily. 30 tablet 11 Unknown    diazePAM (Valium) 5 mg tablet 1 tab 30 min before MRI, can repeat immediately before MRI if no effect 2 tablet 0 3/19/2024    donepezil (Aricept) 5 mg tablet Take 1 tablet (5 mg) by mouth once daily at bedtime. 90 tablet 3 Unknown    lisinopril 2.5 mg tablet Take 1 tablet (2.5 mg) by  "mouth once daily.       LORazepam (Ativan) 0.5 mg tablet Take 1-2 tabs 30 minutes before MRI 2 tablet 0 3/19/2024    sertraline (Zoloft) 100 mg tablet Take 1 tablet (100 mg) by mouth once daily.       simvastatin (Zocor) 40 mg tablet Take 1 tablet (40 mg) by mouth once daily at bedtime.          Review of Systems  Neurological Exam  Physical Exam  Last Recorded Vitals  Blood pressure 109/54, pulse 66, temperature 36.3 °C (97.3 °F), temperature source Temporal, resp. rate 16, height 1.676 m (5' 6\"), weight 59 kg (130 lb), SpO2 95 %.    Gen: NAD  Neuro:  --HIF: A&O X 3, repetition and naming intact  --CN:  PERRLA, EOMI, VFF, no visible facial asymmetry, facial sensation intact, no tongue or palatal deviation, SCM intact  --Motor: Moves all 4 extremities equally; no focal deficits  --Sensory: Intact to light touch, intact to pinprick  --Reflex: 2+ symmetric, toes down  --Cerebellum: FTN and HTS intact  --Gait: Deferred     Relevant Results        NIH Stroke Scale  1A. Level of Consciousness: Alert, Keenly Responsive  1B. Ask Month and Age: Both Questions Right  1C. Blink Eyes & Squeeze Hands: Performs Both Tasks  2. Best Gaze: Normal  3. Visual: No Visual Loss  4. Facial Palsy: Normal Symmetrical Movements  5A. Motor - Left Arm: No Drift  5B. Motor - Right Arm: No Drift  6A. Motor - Left Leg: No Drift  6B. Motor - Right Leg: No Drift  7. Limb Ataxia: Absent  8. Sensory Loss: Normal  9. Best Language: No Aphasia  10. Dysarthria: Normal  11. Extinction and Inattention: No Abnormality  NIH Stroke Scale: 0           Bren Coma Scale  Best Eye Response: Spontaneous  Best Verbal Response: Oriented  Best Motor Response: Follows commands  Bren Coma Scale Score: 15                 I have personally reviewed the following imaging results CT head wo IV contrast    Result Date: 3/23/2024  Interpreted By:  Schoenberger, Joseph, STUDY: CT HEAD WO IV CONTRAST;  3/23/2024 2:09 pm   INDICATION: Signs/Symptoms:Confusion, memory " loss.   COMPARISON: None.   ACCESSION NUMBER(S): [id number]   ORDERING CLINICIAN: EH RIVERA   TECHNIQUE: Noncontrast axial CT scan of head was performed. Angled reformats in brain and bone windows were generated. The images were reviewed in bone, brain, blood and soft tissue windows.   FINDINGS: CSF Spaces: Enlarged due to parenchymal volume loss. Normal configuration with intact basal cisterns. There is no extraaxial fluid collection.   Parenchyma: The grey-white differentiation is intact. There is no mass effect or midline shift.  There is no intracranial hemorrhage.   Calvarium: The calvarium is unremarkable.   Paranasal sinuses and mastoids: Visualized paranasal sinuses and mastoids are clear.       No evidence of acute cortical infarct or intracranial hemorrhage.   No evidence of intracranial hemorrhage or displaced skull fracture.   MACRO: None   Signed by: Joseph Schoenberger 3/23/2024 2:21 PM Dictation workstation:   [id number]       Assessment/Plan   Principal Problem:    Change in mental state  Active Problems:    Coronary artery disease involving native coronary artery of native heart without angina pectoris    HTN (hypertension)      Confusion  Dementia    - nonfocal exam  - CT brain reviewed and was unremarkable  - given that her exam is normal, okay to DC MRI Brain  - no further neurological workup             John Coles MD

## 2024-03-23 NOTE — H&P
"History Of Present Illness  Diomedes Taylor is a 73 y.o. female with PMHX  Dementia, anxieety/depression, HTN and CAD;  presenting to [facility] from Home (alone) on 3/23/2024 with worsening confusion.    She tells me she grew up living in [place] but moved to [place] with her 2 children due to their asthma.  She is .  Her children are grown and now lives in [place] and [facility].  She moved back to [facility] last year to be closer with her brother and lives in a senior apartment building.  She has noticed a decline in her memory in the past few months and feels embarrassed by this, often trying to hide it.  She no longer drives and walks about a half a mile to her local grocery store.  She still administers her own medications via a weekly pill container.  She believes she sleeps well through the night and lately often feels tired and anxious.  She eats 3 meals a day but has lost over 30 pounds unintentionally in the past few months.  She denies difficulty swallowing or poor dentition or abdominal pain or diarrhea.  Lately she reports feeling \"foggy\" and is aware that she often repeats and asks the same question due to poor memory.  She feels her physical health is good and denies any weakness or difficulty with tremors or feeling of off balance.  She is open to moving into an assisted living facility where they can help with her medications and transportation to appointments and facilitating grocery shopping.  She tells me it is important for her to be able to stay active.  She denies shortness of breath, cough, chest pain, headache, fever or chills and does not have any urinary or GI issues at this time.  She is somewhat of a poor historian and is alert and oriented x 2-3.  She has noted some sinus pressure and according to report she has a collapse sinus canal.     In the emergency room lab work and imaging were unremarkable.  Review of records indicates she had an MRI February 8 that showed a " small 7 mm meningioma.     Past Medical History  She has a past medical history of Anxiety and depression (01/24/2021), Coronary artery disease involving native coronary artery of native heart without angina pectoris (02/17/2021), HTN (hypertension) (03/23/2024), and Major depression, recurrent, chronic (CMS/HCC) (10/10/2023).    Surgical History  She has a past surgical history that includes Hysterectomy.     Social History  She reports that she has never smoked. She has never used smokeless tobacco. She reports that she does not currently use alcohol. She reports that she does not use drugs.    Family History  Family History   Problem Relation Name Age of Onset    Dementia Mother      Dementia Father          Allergies  Patient has no known allergies.    Review of Systems   Constitutional:  Positive for fatigue and unexpected weight change. Negative for activity change, appetite change, chills and fever.   HENT:  Positive for hearing loss and sinus pressure. Negative for dental problem, sore throat and trouble swallowing.    Eyes: Negative.    Respiratory:  Negative for cough, shortness of breath and wheezing.    Cardiovascular:  Negative for chest pain, palpitations and leg swelling.   Gastrointestinal:  Negative for abdominal pain, constipation, diarrhea, nausea and vomiting.   Endocrine: Negative.    Genitourinary:  Negative for dysuria, flank pain, frequency, hematuria and urgency.   Musculoskeletal: Negative.    Skin:  Negative for rash and wound.   Allergic/Immunologic: Negative for environmental allergies.   Neurological:  Negative for dizziness, facial asymmetry, speech difficulty, weakness, light-headedness, numbness and headaches.        Forgetful   Psychiatric/Behavioral:  Positive for confusion. Negative for hallucinations and sleep disturbance.         Physical Exam  Vitals reviewed.   Constitutional:       General: She is not in acute distress.     Appearance: She is normal weight. She is not  ill-appearing.   HENT:      Head: Normocephalic and atraumatic.      Right Ear: External ear normal.      Left Ear: External ear normal.      Nose: Nose normal.      Mouth/Throat:      Mouth: Mucous membranes are dry.      Pharynx: Oropharynx is clear.   Eyes:      General: No visual field deficit or scleral icterus.        Right eye: No discharge.         Left eye: No discharge.      Extraocular Movements: Extraocular movements intact.      Pupils: Pupils are equal, round, and reactive to light.   Cardiovascular:      Rate and Rhythm: Normal rate and regular rhythm.      Pulses: Normal pulses.      Heart sounds: Normal heart sounds. No murmur heard.  Pulmonary:      Effort: Pulmonary effort is normal. No respiratory distress.      Breath sounds: Normal breath sounds. No wheezing or rhonchi.   Abdominal:      General: Abdomen is flat. Bowel sounds are normal. There is no distension.      Tenderness: There is no abdominal tenderness.   Musculoskeletal:         General: No swelling, deformity or signs of injury. Normal range of motion.      Cervical back: Normal range of motion.      Right lower leg: No edema.      Left lower leg: No edema.   Skin:     General: Skin is warm and dry.      Capillary Refill: Capillary refill takes less than 2 seconds.      Findings: No bruising or erythema.   Neurological:      Mental Status: She is alert.      Cranial Nerves: No cranial nerve deficit, dysarthria or facial asymmetry.      Sensory: Sensation is intact.      Motor: Motor function is intact. No weakness, tremor or pronator drift.      Coordination: Coordination is intact. Finger-Nose-Finger Test and Heel to Shin Test normal.      Comments: Alert oriented to name, birthdate and age but not current year. Can state location and provide some history/details. But repeatedly asking if she is staying the night at the hospital and forgetful we just discussed her plan of care. PRICE no focal weakness/deficit. PERRL intact, speech  "clear, smile symmetrical.          Last Recorded Vitals  Blood pressure 121/56, pulse 78, temperature 35.6 °C (96.1 °F), resp. rate 17, height 1.676 m (5' 6\"), weight 59.4 kg (130 lb 15.3 oz), SpO2 95 %.  Visit Vitals  /56   Pulse 78   Temp 35.6 °C (96.1 °F)   Resp 17   Ht 1.676 m (5' 6\")   Wt 59.4 kg (130 lb 15.3 oz)   SpO2 95%   BMI 21.14 kg/m²   Smoking Status Never   BSA 1.66 m²     Weight: 59 kg (130 lb)   Pain Assessment: 0-10  Pain Score: 0 - No pain        Relevant Results  Results for orders placed or performed during the hospital encounter of 03/23/24 (from the past 24 hour(s))   Urinalysis with Reflex Culture and Microscopic   Result Value Ref Range    Color, Urine Fay (N) Straw, Yellow    Appearance, Urine Hazy (N) Clear    Specific Gravity, Urine 1.028 1.005 - 1.035    pH, Urine 5.0 5.0, 5.5, 6.0, 6.5, 7.0, 7.5, 8.0    Protein, Urine 30 (1+) (N) NEGATIVE mg/dL    Glucose, Urine NEGATIVE NEGATIVE mg/dL    Blood, Urine NEGATIVE NEGATIVE    Ketones, Urine NEGATIVE NEGATIVE mg/dL    Bilirubin, Urine NEGATIVE NEGATIVE    Urobilinogen, Urine <2.0 <2.0 mg/dL    Nitrite, Urine NEGATIVE NEGATIVE    Leukocyte Esterase, Urine NEGATIVE NEGATIVE   Urinalysis Microscopic   Result Value Ref Range    WBC, Urine 1-5 1-5, NONE /HPF    RBC, Urine NONE NONE, 1-2, 3-5 /HPF    Squamous Epithelial Cells, Urine 1-9 (SPARSE) Reference range not established. /HPF    Mucus, Urine 1+ Reference range not established. /LPF    Calcium Oxalate Crystals, Urine 4+ (A) NONE, 1+ /HPF   CBC and Auto Differential   Result Value Ref Range    WBC 5.4 4.4 - 11.3 x10*3/uL    nRBC 0.0 0.0 - 0.0 /100 WBCs    RBC 3.98 (L) 4.00 - 5.20 x10*6/uL    Hemoglobin 12.2 12.0 - 16.0 g/dL    Hematocrit 37.4 36.0 - 46.0 %    MCV 94 80 - 100 fL    MCH 30.7 26.0 - 34.0 pg    MCHC 32.6 32.0 - 36.0 g/dL    RDW 12.0 11.5 - 14.5 %    Platelets 170 150 - 450 x10*3/uL    Neutrophils % 84.3 40.0 - 80.0 %    Immature Granulocytes %, Automated 0.2 0.0 - 0.9 %    " Lymphocytes % 8.6 13.0 - 44.0 %    Monocytes % 5.6 2.0 - 10.0 %    Eosinophils % 0.9 0.0 - 6.0 %    Basophils % 0.4 0.0 - 2.0 %    Neutrophils Absolute 4.54 1.60 - 5.50 x10*3/uL    Immature Granulocytes Absolute, Automated 0.01 0.00 - 0.50 x10*3/uL    Lymphocytes Absolute 0.46 (L) 0.80 - 3.00 x10*3/uL    Monocytes Absolute 0.30 0.05 - 0.80 x10*3/uL    Eosinophils Absolute 0.05 0.00 - 0.40 x10*3/uL    Basophils Absolute 0.02 0.00 - 0.10 x10*3/uL   Comprehensive metabolic panel   Result Value Ref Range    Glucose 99 74 - 99 mg/dL    Sodium 138 136 - 145 mmol/L    Potassium 3.8 3.5 - 5.3 mmol/L    Chloride 103 98 - 107 mmol/L    Bicarbonate 28 21 - 32 mmol/L    Anion Gap 11 10 - 20 mmol/L    Urea Nitrogen 16 6 - 23 mg/dL    Creatinine 0.96 0.50 - 1.05 mg/dL    eGFR 62 >60 mL/min/1.73m*2    Calcium 8.8 8.6 - 10.3 mg/dL    Albumin 3.8 3.4 - 5.0 g/dL    Alkaline Phosphatase 73 33 - 136 U/L    Total Protein 6.4 6.4 - 8.2 g/dL    AST 17 9 - 39 U/L    Bilirubin, Total 0.6 0.0 - 1.2 mg/dL    ALT 12 7 - 45 U/L   Troponin I, High Sensitivity   Result Value Ref Range    Troponin I, High Sensitivity 3 0 - 13 ng/L   Protime-INR   Result Value Ref Range    Protime 12.6 9.8 - 12.8 seconds    INR 1.1 0.9 - 1.1   Ethanol   Result Value Ref Range    Alcohol <10 <=10 mg/dL   TSH with reflex to Free T4 if abnormal   Result Value Ref Range    Thyroid Stimulating Hormone 1.44 0.44 - 3.98 mIU/L   Ammonia   Result Value Ref Range    Ammonia 16 16 - 53 umol/L   Lipid Panel   Result Value Ref Range    Cholesterol 166 0 - 199 mg/dL    HDL-Cholesterol 75.5 mg/dL    Cholesterol/HDL Ratio 2.2     LDL Calculated 76 <=99 mg/dL    VLDL 15 0 - 40 mg/dL    Triglycerides 73 0 - 149 mg/dL    Non HDL Cholesterol 91 0 - 149 mg/dL   POCT GLUCOSE   Result Value Ref Range    POCT Glucose 144 (H) 74 - 99 mg/dL      CT head wo IV contrast    Result Date: 3/23/2024  Interpreted By:  Schoenberger, Joseph, STUDY: CT HEAD WO IV CONTRAST;  3/23/2024 2:09 pm    INDICATION: Signs/Symptoms:Confusion, memory loss.   COMPARISON: None.   ACCESSION NUMBER(S): [id number]   ORDERING CLINICIAN: EH RIVERA   TECHNIQUE: Noncontrast axial CT scan of head was performed. Angled reformats in brain and bone windows were generated. The images were reviewed in bone, brain, blood and soft tissue windows.   FINDINGS: CSF Spaces: Enlarged due to parenchymal volume loss. Normal configuration with intact basal cisterns. There is no extraaxial fluid collection.   Parenchyma: The grey-white differentiation is intact. There is no mass effect or midline shift.  There is no intracranial hemorrhage.   Calvarium: The calvarium is unremarkable.   Paranasal sinuses and mastoids: Visualized paranasal sinuses and mastoids are clear.       No evidence of acute cortical infarct or intracranial hemorrhage.   No evidence of intracranial hemorrhage or displaced skull fracture.   MACRO: None   Signed by: Joseph Schoenberger 3/23/2024 2:21 PM Dictation workstation:   [id number]     No results found for this or any previous visit (from the past 4464 hour(s)).    Home Medications  Prior to Admission medications    Medication Sig Start Date End Date Taking? Authorizing Provider   buPROPion (Wellbutrin) 75 mg tablet Take 1 tablet (75 mg) by mouth 2 times a day.    Historical Provider, MD   cyanocobalamin (Vitamin B-12) 1,000 mcg tablet Take 1 tablet (1,000 mcg) by mouth once daily. 12/26/23 12/25/24  Mathieu Ramirez MD   diazePAM (Valium) 5 mg tablet 1 tab 30 min before MRI, can repeat immediately before MRI if no effect 1/4/24   Ruslan Smith MD   donepezil (Aricept) 5 mg tablet Take 1 tablet (5 mg) by mouth once daily at bedtime. 2/20/24 2/19/25  Mathieu Ramirez MD   lisinopril 2.5 mg tablet Take 1 tablet (2.5 mg) by mouth once daily.    Historical Provider, MD   LORazepam (Ativan) 0.5 mg tablet Take 1-2 tabs 30 minutes before MRI 1/30/24   Mathieu Ramirez MD   sertraline (Zoloft) 100 mg tablet Take 1  tablet (100 mg) by mouth once daily.    Historical Provider, MD   simvastatin (Zocor) 40 mg tablet Take 1 tablet (40 mg) by mouth once daily at bedtime.    Historical Provider, MD   azelastine (Astelin) 137 mcg (0.1 %) nasal spray Patient's comments: 2 times daily 10/2/23 3/23/24  Historical Provider, MD   buPROPion (Wellbutrin) 75 mg tablet Patient's comments: 2 times daily 10/2/23 3/23/24  Historical Provider, MD   diphenhydrAMINE (Sominex) 25 mg tablet Take 2 tablets (50 mg) by mouth 2 times a day. Patient reported 8/7/23 3/23/24  Historical Provider, MD   lisinopril (ZestriL) 2.5 mg tablet Patient's comments:    She only takes them when she thinks she needs them  3/23/24  Historical Provider, MD   MULTIVITAMIN ORAL COMBINATION OF APPROX 5 VITAMINS  3/23/24  Historical Provider, MD   omeprazole (PriLOSEC) 40 mg DR capsule Patient's comments:    She only takes them when she thinks she needs them  3/23/24  Historical Provider, MD   pantoprazole (Protonix) 40 mg EC tablet Patient's comments:    She only takes them when she thinks she needs them  3/23/24  Historical Provider, MD   sertraline (Zoloft) 100 mg tablet Patient's comments:    She only takes them when she thinks she needs them  3/23/24  Historical Provider, MD   simvastatin (Zocor) 20 mg tablet Patient's comments:    She only takes them when she thinks she needs them  3/23/24  Historical Provider, MD       Medications  Scheduled medications  [START ON 3/24/2024] aspirin, 81 mg, oral, Daily  atorvastatin, 80 mg, oral, Nightly  enoxaparin, 40 mg, subcutaneous, q24h  polyethylene glycol, 17 g, oral, Daily      Continuous medications     PRN medications  PRN medications: hydrALAZINE **FOLLOWED BY** [START ON 3/25/2024] hydrALAZINE, labetaloL, oxygen      Assessment/Plan   Principal Problem:    Change in mental state  Active Problems:    Coronary artery disease involving native coronary artery of native heart without angina pectoris    HTN (hypertension)          Plan: Stroke order set placed.Neurology consult. Psychiatry consult for anxiety. Check Ammonia, TTSH, B12 and folate. Monitor on tele. CM for likely memory care placement. PT/OT.     CODE STATUS: Full code per discussion with patient.     Further evaluation and management per attending and consulting physicians.      This note has been transcribed using Dragon voice recognition system and there is a possibility of unintentional typing misprints.  Any information found to be copied from previous providers is done in the best interest of the patient to provide accurate, quality, and continuity of care.     I spent 40 minutes in the professional and overall care of this patient.      Desiree Yeh, APRN-CNP  [facility]

## 2024-03-24 LAB
ANION GAP SERPL CALC-SCNC: 9 MMOL/L (ref 10–20)
BASOPHILS # BLD AUTO: 0.01 X10*3/UL (ref 0–0.1)
BASOPHILS NFR BLD AUTO: 0.2 %
BUN SERPL-MCNC: 20 MG/DL (ref 6–23)
CALCIUM SERPL-MCNC: 8.7 MG/DL (ref 8.6–10.3)
CHLORIDE SERPL-SCNC: 104 MMOL/L (ref 98–107)
CO2 SERPL-SCNC: 31 MMOL/L (ref 21–32)
CREAT SERPL-MCNC: 0.97 MG/DL (ref 0.5–1.05)
EGFRCR SERPLBLD CKD-EPI 2021: 61 ML/MIN/1.73M*2
EOSINOPHIL # BLD AUTO: 0.09 X10*3/UL (ref 0–0.4)
EOSINOPHIL NFR BLD AUTO: 1.9 %
ERYTHROCYTE [DISTWIDTH] IN BLOOD BY AUTOMATED COUNT: 12.3 % (ref 11.5–14.5)
EST. AVERAGE GLUCOSE BLD GHB EST-MCNC: 105 MG/DL
FOLATE SERPL-MCNC: >24 NG/ML
GLUCOSE BLD MANUAL STRIP-MCNC: 105 MG/DL (ref 74–99)
GLUCOSE BLD MANUAL STRIP-MCNC: 179 MG/DL (ref 74–99)
GLUCOSE BLD MANUAL STRIP-MCNC: 95 MG/DL (ref 74–99)
GLUCOSE BLD MANUAL STRIP-MCNC: 97 MG/DL (ref 74–99)
GLUCOSE SERPL-MCNC: 84 MG/DL (ref 74–99)
HBA1C MFR BLD: 5.3 %
HCT VFR BLD AUTO: 35.4 % (ref 36–46)
HGB BLD-MCNC: 11.4 G/DL (ref 12–16)
HOLD SPECIMEN: NORMAL
IMM GRANULOCYTES # BLD AUTO: 0.01 X10*3/UL (ref 0–0.5)
IMM GRANULOCYTES NFR BLD AUTO: 0.2 % (ref 0–0.9)
LYMPHOCYTES # BLD AUTO: 0.94 X10*3/UL (ref 0.8–3)
LYMPHOCYTES NFR BLD AUTO: 19.6 %
MAGNESIUM SERPL-MCNC: 1.94 MG/DL (ref 1.6–2.4)
MCH RBC QN AUTO: 30.4 PG (ref 26–34)
MCHC RBC AUTO-ENTMCNC: 32.2 G/DL (ref 32–36)
MCV RBC AUTO: 94 FL (ref 80–100)
MONOCYTES # BLD AUTO: 0.49 X10*3/UL (ref 0.05–0.8)
MONOCYTES NFR BLD AUTO: 10.2 %
NEUTROPHILS # BLD AUTO: 3.25 X10*3/UL (ref 1.6–5.5)
NEUTROPHILS NFR BLD AUTO: 67.9 %
NRBC BLD-RTO: 0 /100 WBCS (ref 0–0)
PLATELET # BLD AUTO: 165 X10*3/UL (ref 150–450)
POTASSIUM SERPL-SCNC: 3.3 MMOL/L (ref 3.5–5.3)
RBC # BLD AUTO: 3.75 X10*6/UL (ref 4–5.2)
SODIUM SERPL-SCNC: 141 MMOL/L (ref 136–145)
VIT B12 SERPL-MCNC: 358 PG/ML (ref 211–911)
WBC # BLD AUTO: 4.8 X10*3/UL (ref 4.4–11.3)

## 2024-03-24 PROCEDURE — 2500000004 HC RX 250 GENERAL PHARMACY W/ HCPCS (ALT 636 FOR OP/ED): Performed by: NURSE PRACTITIONER

## 2024-03-24 PROCEDURE — 2500000002 HC RX 250 W HCPCS SELF ADMINISTERED DRUGS (ALT 637 FOR MEDICARE OP, ALT 636 FOR OP/ED): Performed by: NURSE PRACTITIONER

## 2024-03-24 PROCEDURE — 83735 ASSAY OF MAGNESIUM: CPT | Performed by: NURSE PRACTITIONER

## 2024-03-24 PROCEDURE — 2500000001 HC RX 250 WO HCPCS SELF ADMINISTERED DRUGS (ALT 637 FOR MEDICARE OP): Performed by: INTERNAL MEDICINE

## 2024-03-24 PROCEDURE — 82947 ASSAY GLUCOSE BLOOD QUANT: CPT | Mod: 59

## 2024-03-24 PROCEDURE — 85025 COMPLETE CBC W/AUTO DIFF WBC: CPT | Performed by: NURSE PRACTITIONER

## 2024-03-24 PROCEDURE — 97161 PT EVAL LOW COMPLEX 20 MIN: CPT | Mod: GP

## 2024-03-24 PROCEDURE — 97165 OT EVAL LOW COMPLEX 30 MIN: CPT | Mod: GO

## 2024-03-24 PROCEDURE — 99213 OFFICE O/P EST LOW 20 MIN: CPT | Performed by: PSYCHIATRY & NEUROLOGY

## 2024-03-24 PROCEDURE — 2500000001 HC RX 250 WO HCPCS SELF ADMINISTERED DRUGS (ALT 637 FOR MEDICARE OP): Performed by: NURSE PRACTITIONER

## 2024-03-24 PROCEDURE — 96372 THER/PROPH/DIAG INJ SC/IM: CPT | Performed by: NURSE PRACTITIONER

## 2024-03-24 PROCEDURE — G0378 HOSPITAL OBSERVATION PER HR: HCPCS

## 2024-03-24 PROCEDURE — 36415 COLL VENOUS BLD VENIPUNCTURE: CPT | Performed by: NURSE PRACTITIONER

## 2024-03-24 PROCEDURE — 80048 BASIC METABOLIC PNL TOTAL CA: CPT | Performed by: NURSE PRACTITIONER

## 2024-03-24 RX ORDER — POTASSIUM CHLORIDE 20 MEQ/1
40 TABLET, EXTENDED RELEASE ORAL EVERY 4 HOURS
Status: DISPENSED | OUTPATIENT
Start: 2024-03-24 | End: 2024-03-24

## 2024-03-24 RX ORDER — POTASSIUM CHLORIDE 1.5 G/1.58G
40 POWDER, FOR SOLUTION ORAL ONCE
Status: COMPLETED | OUTPATIENT
Start: 2024-03-24 | End: 2024-03-24

## 2024-03-24 RX ADMIN — BUPROPION HYDROCHLORIDE 75 MG: 75 TABLET, FILM COATED ORAL at 20:52

## 2024-03-24 RX ADMIN — DONEPEZIL HYDROCHLORIDE 5 MG: 5 TABLET ORAL at 20:52

## 2024-03-24 RX ADMIN — POTASSIUM CHLORIDE 40 MEQ: 1500 TABLET, EXTENDED RELEASE ORAL at 15:21

## 2024-03-24 RX ADMIN — POTASSIUM CHLORIDE 40 MEQ: 1.5 POWDER, FOR SOLUTION ORAL at 11:12

## 2024-03-24 RX ADMIN — LISINOPRIL 2.5 MG: 2.5 TABLET ORAL at 09:23

## 2024-03-24 RX ADMIN — SERTRALINE HYDROCHLORIDE 100 MG: 100 TABLET ORAL at 09:23

## 2024-03-24 RX ADMIN — ASPIRIN 81 MG: 81 TABLET, COATED ORAL at 09:23

## 2024-03-24 RX ADMIN — ATORVASTATIN CALCIUM 80 MG: 80 TABLET, FILM COATED ORAL at 20:52

## 2024-03-24 RX ADMIN — ENOXAPARIN SODIUM 40 MG: 40 INJECTION SUBCUTANEOUS at 15:21

## 2024-03-24 RX ADMIN — BUPROPION HYDROCHLORIDE 75 MG: 75 TABLET, FILM COATED ORAL at 09:23

## 2024-03-24 RX ADMIN — CYANOCOBALAMIN TAB 1000 MCG 1000 MCG: 1000 TAB at 09:23

## 2024-03-24 ASSESSMENT — PAIN SCALES - GENERAL
PAINLEVEL_OUTOF10: 0 - NO PAIN

## 2024-03-24 ASSESSMENT — COGNITIVE AND FUNCTIONAL STATUS - GENERAL
MOBILITY SCORE: 24
DAILY ACTIVITIY SCORE: 24
MOBILITY SCORE: 24
DAILY ACTIVITIY SCORE: 24

## 2024-03-24 ASSESSMENT — ENCOUNTER SYMPTOMS
ABDOMINAL DISTENTION: 0
EYE DISCHARGE: 0
CONFUSION: 1
ACTIVITY CHANGE: 1
WEAKNESS: 1
COUGH: 0
ARTHRALGIAS: 1
DIFFICULTY URINATING: 0

## 2024-03-24 ASSESSMENT — ACTIVITIES OF DAILY LIVING (ADL): ADL_ASSISTANCE: INDEPENDENT

## 2024-03-24 ASSESSMENT — PAIN - FUNCTIONAL ASSESSMENT
PAIN_FUNCTIONAL_ASSESSMENT: 0-10
PAIN_FUNCTIONAL_ASSESSMENT: 0-10

## 2024-03-24 NOTE — H&P
History Of Present Illness  Diomedes Taylor is a 73 y.o. female presenting with altered mental status  Patient has a history of dementia, anxiety depression hypertension CAD presented with worsening confusion and altered mental status.  Patient is a poor historian and history is obtained from her from the chart and from brother.  She lives in a senior apartment living has been increasingly confused.  She feels that her memory has been declining she does not drive anymore her brother has been very helpful she eats regularly but has lost weight.  She needs help with obtaining medications and also with some activities of daily living she is alert oriented x 2 very forgetful keeps on repeating the same questions again she had an MRI in February which showed a small 7 mm hemangioma there is no history of any fever or chills no sick contacts she has been taking her medications     Past Medical History  She has a past medical history of Anxiety and depression (01/24/2021), Coronary artery disease involving native coronary artery of native heart without angina pectoris (02/17/2021), HTN (hypertension) (03/23/2024), and Major depression, recurrent, chronic (CMS/HCC) (10/10/2023).    Surgical History  She has a past surgical history that includes Hysterectomy.     Social History  She reports that she has never smoked. She has never used smokeless tobacco. She reports that she does not currently use alcohol. She reports that she does not use drugs.    Family History  Family History   Problem Relation Name Age of Onset    Dementia Mother      Dementia Father          Allergies  Patient has no known allergies.    Review of Systems   Constitutional:  Positive for activity change.   HENT:  Negative for congestion.    Eyes:  Negative for discharge.   Respiratory:  Negative for cough.    Cardiovascular:  Negative for chest pain.   Gastrointestinal:  Negative for abdominal distention.   Genitourinary:  Negative for difficulty  urinating.   Musculoskeletal:  Positive for arthralgias.   Neurological:  Positive for weakness.   Psychiatric/Behavioral:  Positive for confusion.         Physical Exam  Constitutional:       Appearance: Normal appearance.   HENT:      Head: Normocephalic and atraumatic.      Nose: Nose normal.   Eyes:      Pupils: Pupils are equal, round, and reactive to light.   Cardiovascular:      Rate and Rhythm: Normal rate.      Pulses: Normal pulses.   Pulmonary:      Effort: Pulmonary effort is normal.   Abdominal:      General: Abdomen is flat.      Palpations: Abdomen is soft.   Musculoskeletal:         General: Normal range of motion.      Cervical back: Normal range of motion.   Skin:     General: Skin is warm and dry.   Neurological:      Mental Status: She is alert.      Comments: Alert confused at baseline easily reoriented moves all extremities no tremors          Last Recorded Vitals  /69   Pulse 54   Temp 36.5 °C (97.7 °F)   Resp 17   Wt 59.4 kg (130 lb 15.3 oz)   SpO2 97%     Relevant Results      No current facility-administered medications on file prior to encounter.     Current Outpatient Medications on File Prior to Encounter   Medication Sig Dispense Refill    buPROPion (Wellbutrin) 75 mg tablet Take 1 tablet (75 mg) by mouth 2 times a day.      cyanocobalamin (Vitamin B-12) 1,000 mcg tablet Take 1 tablet (1,000 mcg) by mouth once daily. 30 tablet 11    diazePAM (Valium) 5 mg tablet 1 tab 30 min before MRI, can repeat immediately before MRI if no effect 2 tablet 0    donepezil (Aricept) 5 mg tablet Take 1 tablet (5 mg) by mouth once daily at bedtime. 90 tablet 3    lisinopril 2.5 mg tablet Take 1 tablet (2.5 mg) by mouth once daily.      LORazepam (Ativan) 0.5 mg tablet Take 1-2 tabs 30 minutes before MRI 2 tablet 0    sertraline (Zoloft) 100 mg tablet Take 1 tablet (100 mg) by mouth once daily.      simvastatin (Zocor) 40 mg tablet Take 1 tablet (40 mg) by mouth once daily at bedtime.       [DISCONTINUED] azelastine (Astelin) 137 mcg (0.1 %) nasal spray Patient's comments: 2 times daily      [DISCONTINUED] buPROPion (Wellbutrin) 75 mg tablet Patient's comments: 2 times daily      [DISCONTINUED] diphenhydrAMINE (Sominex) 25 mg tablet Take 2 tablets (50 mg) by mouth 2 times a day. Patient reported      [DISCONTINUED] lisinopril (ZestriL) 2.5 mg tablet Patient's comments:    She only takes them when she thinks she needs them      [DISCONTINUED] MULTIVITAMIN ORAL COMBINATION OF APPROX 5 VITAMINS      [DISCONTINUED] omeprazole (PriLOSEC) 40 mg DR capsule Patient's comments:    She only takes them when she thinks she needs them      [DISCONTINUED] pantoprazole (Protonix) 40 mg EC tablet Patient's comments:    She only takes them when she thinks she needs them      [DISCONTINUED] sertraline (Zoloft) 100 mg tablet Patient's comments:    She only takes them when she thinks she needs them      [DISCONTINUED] simvastatin (Zocor) 20 mg tablet Patient's comments:    She only takes them when she thinks she needs them       Results for orders placed or performed during the hospital encounter of 03/23/24 (from the past 24 hour(s))   Urinalysis with Reflex Culture and Microscopic   Result Value Ref Range    Color, Urine Fay (N) Straw, Yellow    Appearance, Urine Hazy (N) Clear    Specific Gravity, Urine 1.028 1.005 - 1.035    pH, Urine 5.0 5.0, 5.5, 6.0, 6.5, 7.0, 7.5, 8.0    Protein, Urine 30 (1+) (N) NEGATIVE mg/dL    Glucose, Urine NEGATIVE NEGATIVE mg/dL    Blood, Urine NEGATIVE NEGATIVE    Ketones, Urine NEGATIVE NEGATIVE mg/dL    Bilirubin, Urine NEGATIVE NEGATIVE    Urobilinogen, Urine <2.0 <2.0 mg/dL    Nitrite, Urine NEGATIVE NEGATIVE    Leukocyte Esterase, Urine NEGATIVE NEGATIVE   Extra Urine Gray Tube   Result Value Ref Range    Extra Tube Hold for add-ons.    Urinalysis Microscopic   Result Value Ref Range    WBC, Urine 1-5 1-5, NONE /HPF    RBC, Urine NONE NONE, 1-2, 3-5 /HPF    Squamous Epithelial  Cells, Urine 1-9 (SPARSE) Reference range not established. /HPF    Mucus, Urine 1+ Reference range not established. /LPF    Calcium Oxalate Crystals, Urine 4+ (A) NONE, 1+ /HPF   CBC and Auto Differential   Result Value Ref Range    WBC 5.4 4.4 - 11.3 x10*3/uL    nRBC 0.0 0.0 - 0.0 /100 WBCs    RBC 3.98 (L) 4.00 - 5.20 x10*6/uL    Hemoglobin 12.2 12.0 - 16.0 g/dL    Hematocrit 37.4 36.0 - 46.0 %    MCV 94 80 - 100 fL    MCH 30.7 26.0 - 34.0 pg    MCHC 32.6 32.0 - 36.0 g/dL    RDW 12.0 11.5 - 14.5 %    Platelets 170 150 - 450 x10*3/uL    Neutrophils % 84.3 40.0 - 80.0 %    Immature Granulocytes %, Automated 0.2 0.0 - 0.9 %    Lymphocytes % 8.6 13.0 - 44.0 %    Monocytes % 5.6 2.0 - 10.0 %    Eosinophils % 0.9 0.0 - 6.0 %    Basophils % 0.4 0.0 - 2.0 %    Neutrophils Absolute 4.54 1.60 - 5.50 x10*3/uL    Immature Granulocytes Absolute, Automated 0.01 0.00 - 0.50 x10*3/uL    Lymphocytes Absolute 0.46 (L) 0.80 - 3.00 x10*3/uL    Monocytes Absolute 0.30 0.05 - 0.80 x10*3/uL    Eosinophils Absolute 0.05 0.00 - 0.40 x10*3/uL    Basophils Absolute 0.02 0.00 - 0.10 x10*3/uL   Comprehensive metabolic panel   Result Value Ref Range    Glucose 99 74 - 99 mg/dL    Sodium 138 136 - 145 mmol/L    Potassium 3.8 3.5 - 5.3 mmol/L    Chloride 103 98 - 107 mmol/L    Bicarbonate 28 21 - 32 mmol/L    Anion Gap 11 10 - 20 mmol/L    Urea Nitrogen 16 6 - 23 mg/dL    Creatinine 0.96 0.50 - 1.05 mg/dL    eGFR 62 >60 mL/min/1.73m*2    Calcium 8.8 8.6 - 10.3 mg/dL    Albumin 3.8 3.4 - 5.0 g/dL    Alkaline Phosphatase 73 33 - 136 U/L    Total Protein 6.4 6.4 - 8.2 g/dL    AST 17 9 - 39 U/L    Bilirubin, Total 0.6 0.0 - 1.2 mg/dL    ALT 12 7 - 45 U/L   Troponin I, High Sensitivity   Result Value Ref Range    Troponin I, High Sensitivity 3 0 - 13 ng/L   Protime-INR   Result Value Ref Range    Protime 12.6 9.8 - 12.8 seconds    INR 1.1 0.9 - 1.1   Ethanol   Result Value Ref Range    Alcohol <10 <=10 mg/dL   TSH with reflex to Free T4 if abnormal    Result Value Ref Range    Thyroid Stimulating Hormone 1.44 0.44 - 3.98 mIU/L   Ammonia   Result Value Ref Range    Ammonia 16 16 - 53 umol/L   Vitamin B12   Result Value Ref Range    Vitamin B12 358 211 - 911 pg/mL   Folate   Result Value Ref Range    Folate, Serum >24.0 >5.0 ng/mL   Lipid Panel   Result Value Ref Range    Cholesterol 166 0 - 199 mg/dL    HDL-Cholesterol 75.5 mg/dL    Cholesterol/HDL Ratio 2.2     LDL Calculated 76 <=99 mg/dL    VLDL 15 0 - 40 mg/dL    Triglycerides 73 0 - 149 mg/dL    Non HDL Cholesterol 91 0 - 149 mg/dL   Hemoglobin A1C   Result Value Ref Range    Hemoglobin A1C 5.3 see below %    Estimated Average Glucose 105 Not Established mg/dL   POCT GLUCOSE   Result Value Ref Range    POCT Glucose 144 (H) 74 - 99 mg/dL   POCT GLUCOSE   Result Value Ref Range    POCT Glucose 160 (H) 74 - 99 mg/dL   Basic Metabolic Panel   Result Value Ref Range    Glucose 84 74 - 99 mg/dL    Sodium 141 136 - 145 mmol/L    Potassium 3.3 (L) 3.5 - 5.3 mmol/L    Chloride 104 98 - 107 mmol/L    Bicarbonate 31 21 - 32 mmol/L    Anion Gap 9 (L) 10 - 20 mmol/L    Urea Nitrogen 20 6 - 23 mg/dL    Creatinine 0.97 0.50 - 1.05 mg/dL    eGFR 61 >60 mL/min/1.73m*2    Calcium 8.7 8.6 - 10.3 mg/dL   CBC and Auto Differential   Result Value Ref Range    WBC 4.8 4.4 - 11.3 x10*3/uL    nRBC 0.0 0.0 - 0.0 /100 WBCs    RBC 3.75 (L) 4.00 - 5.20 x10*6/uL    Hemoglobin 11.4 (L) 12.0 - 16.0 g/dL    Hematocrit 35.4 (L) 36.0 - 46.0 %    MCV 94 80 - 100 fL    MCH 30.4 26.0 - 34.0 pg    MCHC 32.2 32.0 - 36.0 g/dL    RDW 12.3 11.5 - 14.5 %    Platelets 165 150 - 450 x10*3/uL    Neutrophils % 67.9 40.0 - 80.0 %    Immature Granulocytes %, Automated 0.2 0.0 - 0.9 %    Lymphocytes % 19.6 13.0 - 44.0 %    Monocytes % 10.2 2.0 - 10.0 %    Eosinophils % 1.9 0.0 - 6.0 %    Basophils % 0.2 0.0 - 2.0 %    Neutrophils Absolute 3.25 1.60 - 5.50 x10*3/uL    Immature Granulocytes Absolute, Automated 0.01 0.00 - 0.50 x10*3/uL    Lymphocytes Absolute  0.94 0.80 - 3.00 x10*3/uL    Monocytes Absolute 0.49 0.05 - 0.80 x10*3/uL    Eosinophils Absolute 0.09 0.00 - 0.40 x10*3/uL    Basophils Absolute 0.01 0.00 - 0.10 x10*3/uL   POCT GLUCOSE   Result Value Ref Range    POCT Glucose 95 74 - 99 mg/dL         Increasing forgetfulness also feels foggy asked the same questions over and over again.  There is no history of any fall seizures or syncope     Assessment/Plan   Principal Problem:    Change in mental state  Active Problems:    Coronary artery disease involving native coronary artery of native heart without angina pectoris    HTN (hypertension)    Altered mental status  Multifactorial  Worsening dementia  UA does not show any acute infection  Aricept 5 mg daily  Fall precautions  Neurology consult    Hypertension  Stable at present, continue to monitor has been taking lisinopril at home  Lisinopril 2.5 mg daily    Anxiety, increasing confusion and weakness no behavior problems reported  Wellbutrin 75 mg daily  Zoloft 100 mg daily    History of coronary artery disease  No anginal symptoms at present  Zocor 20 mg daily    Inability to manage alone  PT OT fall precautions  Social service consult    Hypokalemia  K level 3.3  K supplement  BMP in a.m.    Mild anemia  H&H 11.4 35.4 normochromic normocytic anemia continue to monitor labs         Shorty Yeung MD

## 2024-03-24 NOTE — PROGRESS NOTES
Occupational Therapy    Occupational Therapy    Evaluation    Patient Name: Diomedes Taylor  MRN: [id number]  Today's Date: 3/24/2024  Time Calculation  Start Time: 0905  Stop Time: 0913  Time Calculation (min): 8 min    Assessment  IP OT Assessment  OT Assessment:  (Pt. has no skilled OT needs, demos independence in room without LOB)  Evaluation/Treatment Tolerance: Patient tolerated treatment well  End of Session Communication: Bedside nurse  End of Session Patient Position: Bed, 3 rail up, Alarm on    Plan:  OT Frequency: OT eval only  OT - OK to Discharge: Yes (Next level of care when cleared by medical team)    Subjective   Per EMR: Patient is a 73-year-old female with Alzheimer's dementia, coronary artery disease, hypertension, depression presenting to the emergency department for confusion. Patient lives at home alone. She called her brother today telling him that her brain feels foggy and that there is a disconnect. Her brother went over to check in on her and she seemed confused, repeating herself multiple times. She does seem improved now, but she has no recollection of calling her brother at all. She is not sure why she is here. She still has what she describes as fogginess and dizziness that she describes the sensation of being drunk. She denies any vertigo. This is never happened for her before. She denies any other symptoms such as chest pain, shortness of breath, nausea or vomiting, abdominal pain, dysuria, headache, vision changes.   Current Problem:  1. Altered mental status, unspecified altered mental status type            General:  General  Reason for Referral: ADLs, discharge planning  Referred By: Dr. Yeung  Family/Caregiver Present: No  Co-Treatment: PT  Co-Treatment Reason: Safety  Prior to Session Communication: Bedside nurse  Patient Position Received: Bed, 3 rail up, Alarm on    Precautions:  Medical Precautions: Fall precautions        Pain:  Pain Assessment  Pain Assessment: 0-10  Pain  Score: 0 - No pain    Objective     Cognition:  Orientation Level: Oriented X4     Home Living:  Home Living Comments:  (Pt. lives in apt with tub shower. PLOF independent, walks to store with rolling cart.)     Prior Function:  Level of [place]: Independent with ADLs and functional transfers  ADL Assistance: Independent  Homemaking Assistance: Independent  Ambulatory Assistance: Independent      ADL:  Grooming Assistance: Modified independent (Device)  LE Dressing Assistance: Modified independent (Device)  Toileting Assistance with Device: Modified independent    Activity Tolerance:  Endurance: Endurance does not limit participation in activity    Bed Mobility/Transfers:   Bed Mobility  Bed Mobility:  (supine to sit MOD I)  Transfers  Transfer:  (sit<>stand MOD I, toilet transfer MOD I)    Ambulation/Gait Training:  Functional Mobility  Functional Mobility Performed:  (Pt. completes functional mobility without AD MOD I)    Sitting Balance:  Static Sitting Balance  Static Sitting-Balance Support: Bilateral upper extremity supported  Static Sitting-Level of Assistance: Independent    Standing Balance:  Static Standing Balance  Static Standing-Balance Support: No upper extremity supported  Static Standing-Level of Assistance: Independent      Sensation:  Sensation Comment: Denies numbness        Hand Function:  Hand Function  Gross Grasp: Functional  Coordination: Functional    Extremities: RUE   RUE : Within Functional Limits and LUE   LUE: Within Functional Limits    Outcome Measures: [facility] Daily Activity  Putting on and taking off regular lower body clothing: None  Bathing (including washing, rinsing, drying): None  Putting on and taking off regular upper body clothing: None  Toileting, which includes using toilet, bedpan or urinal: None  Taking care of personal grooming such as brushing teeth: None  Eating Meals: None  Daily Activity - Total Score: 24          EDUCATION:  Education  Individual(s) Educated:  Patient  Education Provided: Fall precautons, Risk and benefits of OT discussed with patient or other, POC discussed and agreed upon  Patient Response to Education: Patient/Caregiver Verbalized Understanding of Information        Goals: NA

## 2024-03-24 NOTE — CONSULTS
Reason for consult:  Depression and anxiety    History Of Present Illness  Diomedes Taylor is a 73 y.o. female presenting with altered mental status.  Patient has been living alone in a senior apartment complex with her brother checking on her every day.  Patient has history of anxiety depression and dementia and she is on 2 different antidepressant compliant with medication.  Patient has been noticed to be increasingly confused with memory declining.  Patient brother brought her to the hospital.  She has been losing weight.  Patient is alert and oriented x 3 during the interview.  She admits to having memory problems but she does not think it is getting worse.  She is very angry at her brother putting her in the hospital for no reason.  Patient denies feeling depressed but anxious about being in the hospital.  She wants to go home.  She does not see any reason why she should be in the hospital.  Patient has been eating and sleeping better.  She is not agitated and has been cooperative with all the care.  She denies any hopeless or worthless feeling.  She denies any audiovisual hallucinations or paranoid thoughts.     Past Medical History  She has a past medical history of Anxiety and depression (01/24/2021), Coronary artery disease involving native coronary artery of native heart without angina pectoris (02/17/2021), HTN (hypertension) (03/23/2024), and Major depression, recurrent, chronic (CMS/HCC) (10/10/2023).    Surgical History  She has a past surgical history that includes Hysterectomy.     Social History  She reports that she has never smoked. She has never used smokeless tobacco. She reports that she does not currently use alcohol. She reports that she does not use drugs.     Allergies  Patient has no known allergies.    Review of Systems    Psychiatric ROS - Adult  Anxiety: Negative  Depression: negative  Delirium: negative  Psychosis: negative  Aurelia: negative  Safety Issues:   Psychiatric ROS Comment:  "    Physical Exam      Mental Status Exam  General: Alert and oriented x 3  Appearance: Stated age  Attitude: Cooperative  Behavior: Normal  Motor Activity: Normal  Speech: Normal rate and rhythm coherent  Mood: Anxious but not depressed  Affect: Reactive  Thought Process: Normal thought process  Thought Content: No delusions or suicidal thoughts  Thought Perception: No hallucinations  Cognition: Impaired recent memory  Insight: Fair  Judgement: Fair      Last Recorded Vitals  Blood pressure 159/69, pulse 54, temperature 36.5 °C (97.7 °F), resp. rate 17, height 1.676 m (5' 6\"), weight 59.4 kg (130 lb 15.3 oz), SpO2 97 %.    Relevant Results  Results for orders placed or performed during the hospital encounter of 03/23/24 (from the past 96 hour(s))   Urinalysis with Reflex Culture and Microscopic   Result Value Ref Range    Color, Urine Fay (N) Straw, Yellow    Appearance, Urine Hazy (N) Clear    Specific Gravity, Urine 1.028 1.005 - 1.035    pH, Urine 5.0 5.0, 5.5, 6.0, 6.5, 7.0, 7.5, 8.0    Protein, Urine 30 (1+) (N) NEGATIVE mg/dL    Glucose, Urine NEGATIVE NEGATIVE mg/dL    Blood, Urine NEGATIVE NEGATIVE    Ketones, Urine NEGATIVE NEGATIVE mg/dL    Bilirubin, Urine NEGATIVE NEGATIVE    Urobilinogen, Urine <2.0 <2.0 mg/dL    Nitrite, Urine NEGATIVE NEGATIVE    Leukocyte Esterase, Urine NEGATIVE NEGATIVE   Extra Urine Gray Tube   Result Value Ref Range    Extra Tube Hold for add-ons.    Urinalysis Microscopic   Result Value Ref Range    WBC, Urine 1-5 1-5, NONE /HPF    RBC, Urine NONE NONE, 1-2, 3-5 /HPF    Squamous Epithelial Cells, Urine 1-9 (SPARSE) Reference range not established. /HPF    Mucus, Urine 1+ Reference range not established. /LPF    Calcium Oxalate Crystals, Urine 4+ (A) NONE, 1+ /HPF   CBC and Auto Differential   Result Value Ref Range    WBC 5.4 4.4 - 11.3 x10*3/uL    nRBC 0.0 0.0 - 0.0 /100 WBCs    RBC 3.98 (L) 4.00 - 5.20 x10*6/uL    Hemoglobin 12.2 12.0 - 16.0 g/dL    Hematocrit 37.4 36.0 - " 46.0 %    MCV 94 80 - 100 fL    MCH 30.7 26.0 - 34.0 pg    MCHC 32.6 32.0 - 36.0 g/dL    RDW 12.0 11.5 - 14.5 %    Platelets 170 150 - 450 x10*3/uL    Neutrophils % 84.3 40.0 - 80.0 %    Immature Granulocytes %, Automated 0.2 0.0 - 0.9 %    Lymphocytes % 8.6 13.0 - 44.0 %    Monocytes % 5.6 2.0 - 10.0 %    Eosinophils % 0.9 0.0 - 6.0 %    Basophils % 0.4 0.0 - 2.0 %    Neutrophils Absolute 4.54 1.60 - 5.50 x10*3/uL    Immature Granulocytes Absolute, Automated 0.01 0.00 - 0.50 x10*3/uL    Lymphocytes Absolute 0.46 (L) 0.80 - 3.00 x10*3/uL    Monocytes Absolute 0.30 0.05 - 0.80 x10*3/uL    Eosinophils Absolute 0.05 0.00 - 0.40 x10*3/uL    Basophils Absolute 0.02 0.00 - 0.10 x10*3/uL   Comprehensive metabolic panel   Result Value Ref Range    Glucose 99 74 - 99 mg/dL    Sodium 138 136 - 145 mmol/L    Potassium 3.8 3.5 - 5.3 mmol/L    Chloride 103 98 - 107 mmol/L    Bicarbonate 28 21 - 32 mmol/L    Anion Gap 11 10 - 20 mmol/L    Urea Nitrogen 16 6 - 23 mg/dL    Creatinine 0.96 0.50 - 1.05 mg/dL    eGFR 62 >60 mL/min/1.73m*2    Calcium 8.8 8.6 - 10.3 mg/dL    Albumin 3.8 3.4 - 5.0 g/dL    Alkaline Phosphatase 73 33 - 136 U/L    Total Protein 6.4 6.4 - 8.2 g/dL    AST 17 9 - 39 U/L    Bilirubin, Total 0.6 0.0 - 1.2 mg/dL    ALT 12 7 - 45 U/L   Troponin I, High Sensitivity   Result Value Ref Range    Troponin I, High Sensitivity 3 0 - 13 ng/L   Protime-INR   Result Value Ref Range    Protime 12.6 9.8 - 12.8 seconds    INR 1.1 0.9 - 1.1   Ethanol   Result Value Ref Range    Alcohol <10 <=10 mg/dL   TSH with reflex to Free T4 if abnormal   Result Value Ref Range    Thyroid Stimulating Hormone 1.44 0.44 - 3.98 mIU/L   Ammonia   Result Value Ref Range    Ammonia 16 16 - 53 umol/L   Vitamin B12   Result Value Ref Range    Vitamin B12 358 211 - 911 pg/mL   Folate   Result Value Ref Range    Folate, Serum >24.0 >5.0 ng/mL   Lipid Panel   Result Value Ref Range    Cholesterol 166 0 - 199 mg/dL    HDL-Cholesterol 75.5 mg/dL     Cholesterol/HDL Ratio 2.2     LDL Calculated 76 <=99 mg/dL    VLDL 15 0 - 40 mg/dL    Triglycerides 73 0 - 149 mg/dL    Non HDL Cholesterol 91 0 - 149 mg/dL   Hemoglobin A1C   Result Value Ref Range    Hemoglobin A1C 5.3 see below %    Estimated Average Glucose 105 Not Established mg/dL   POCT GLUCOSE   Result Value Ref Range    POCT Glucose 144 (H) 74 - 99 mg/dL   POCT GLUCOSE   Result Value Ref Range    POCT Glucose 160 (H) 74 - 99 mg/dL   Basic Metabolic Panel   Result Value Ref Range    Glucose 84 74 - 99 mg/dL    Sodium 141 136 - 145 mmol/L    Potassium 3.3 (L) 3.5 - 5.3 mmol/L    Chloride 104 98 - 107 mmol/L    Bicarbonate 31 21 - 32 mmol/L    Anion Gap 9 (L) 10 - 20 mmol/L    Urea Nitrogen 20 6 - 23 mg/dL    Creatinine 0.97 0.50 - 1.05 mg/dL    eGFR 61 >60 mL/min/1.73m*2    Calcium 8.7 8.6 - 10.3 mg/dL   CBC and Auto Differential   Result Value Ref Range    WBC 4.8 4.4 - 11.3 x10*3/uL    nRBC 0.0 0.0 - 0.0 /100 WBCs    RBC 3.75 (L) 4.00 - 5.20 x10*6/uL    Hemoglobin 11.4 (L) 12.0 - 16.0 g/dL    Hematocrit 35.4 (L) 36.0 - 46.0 %    MCV 94 80 - 100 fL    MCH 30.4 26.0 - 34.0 pg    MCHC 32.2 32.0 - 36.0 g/dL    RDW 12.3 11.5 - 14.5 %    Platelets 165 150 - 450 x10*3/uL    Neutrophils % 67.9 40.0 - 80.0 %    Immature Granulocytes %, Automated 0.2 0.0 - 0.9 %    Lymphocytes % 19.6 13.0 - 44.0 %    Monocytes % 10.2 2.0 - 10.0 %    Eosinophils % 1.9 0.0 - 6.0 %    Basophils % 0.2 0.0 - 2.0 %    Neutrophils Absolute 3.25 1.60 - 5.50 x10*3/uL    Immature Granulocytes Absolute, Automated 0.01 0.00 - 0.50 x10*3/uL    Lymphocytes Absolute 0.94 0.80 - 3.00 x10*3/uL    Monocytes Absolute 0.49 0.05 - 0.80 x10*3/uL    Eosinophils Absolute 0.09 0.00 - 0.40 x10*3/uL    Basophils Absolute 0.01 0.00 - 0.10 x10*3/uL   Magnesium   Result Value Ref Range    Magnesium 1.94 1.60 - 2.40 mg/dL   POCT GLUCOSE   Result Value Ref Range    POCT Glucose 95 74 - 99 mg/dL   POCT GLUCOSE   Result Value Ref Range    POCT Glucose 179 (H) 74 - 99  mg/dL     CT head wo IV contrast    Result Date: 3/23/2024  Interpreted By:  Schoenberger, Joseph, STUDY: CT HEAD WO IV CONTRAST;  3/23/2024 2:09 pm   INDICATION: Signs/Symptoms:Confusion, memory loss.   COMPARISON: None.   ACCESSION NUMBER(S): [id number]   ORDERING CLINICIAN: EH RIVERA   TECHNIQUE: Noncontrast axial CT scan of head was performed. Angled reformats in brain and bone windows were generated. The images were reviewed in bone, brain, blood and soft tissue windows.   FINDINGS: CSF Spaces: Enlarged due to parenchymal volume loss. Normal configuration with intact basal cisterns. There is no extraaxial fluid collection.   Parenchyma: The grey-white differentiation is intact. There is no mass effect or midline shift.  There is no intracranial hemorrhage.   Calvarium: The calvarium is unremarkable.   Paranasal sinuses and mastoids: Visualized paranasal sinuses and mastoids are clear.       No evidence of acute cortical infarct or intracranial hemorrhage.   No evidence of intracranial hemorrhage or displaced skull fracture.   MACRO: None   Signed by: Joseph Schoenberger 3/23/2024 2:21 PM Dictation workstation:   [id number]             Assessment/Plan   Depression Unspecified  Delirium resolving  Principal Problem:    Change in mental state  Active Problems:    Coronary artery disease involving native coronary artery of native heart without angina pectoris    HTN (hypertension)      Patient does not appear to be clinically depressed but anxious about being admitted to the hospital.  Patient is on 2 different antidepressant medication which is currently being monitored by her primary care doctor and she said that she is doing good with those medication.  She does not want any change in her medication since he has been stable.  Patient gave permission to talk to her brother and is cooperating with all care.  Will try and reach out to her brother's later today or tomorrow morning to get collateral.  His social  worker can reach out to the brother and obtain some baseline functioning of the patient at home this will help me to come up with a management plan  Will continue to follow           Medication Consent  Medication Consent: risks, benefits, side effects reviewed for all ordered meds    Abner Fierro MD

## 2024-03-24 NOTE — CARE PLAN
The patient's goals for the shift include      The clinical goals for the shift include pt will remain free of injury throughout shift

## 2024-03-24 NOTE — PROGRESS NOTES
Physical Therapy    Physical Therapy Evaluation    Patient Name: Diomedes Taylor  MRN: [id number]  Today's Date: 3/24/2024   Time Calculation  Start Time: 0906  Stop Time: 0911  Time Calculation (min): 5 min  3133  Assessment/Plan   PT Assessment  End of Session Communication: Bedside nurse  End of Session Patient Position: Bed, 3 rail up, Alarm on  IP OR SWING BED PT PLAN  Inpatient or Swing Bed: Inpatient  PT Plan  PT Plan: PT Eval only  PT Eval Only Reason: No acute PT needs identified  PT Discharge Recommendations: No further acute PT, No PT needed after discharge  PT Recommended Transfer Status: Independent  PT - OK to Discharge: Yes - To next level of care when cleared by medical team    Pt demonstrates modified independence in bed mobility, transfers, and ambulation. Verbalized and demonstrated that they do not need additional skilled therapy at this time. Will DC PT orders.       Subjective   Per EMR:   Patient is a 73-year-old female  presenting to the emergency department for confusion. Patient lives at home alone. She called her brother today telling him that her brain feels foggy and that there is a disconnect. Her brother went over to check in on her and she seemed confused, repeating herself multiple times. She does seem improved now, but she has no recollection of calling her brother at all. She is not sure why she is here. She still has what she describes as fogginess and dizziness that she describes the sensation of being drunk. She denies any vertigo. This is never happened for her before. She denies any other symptoms such as chest pain, shortness of breath, nausea or vomiting, abdominal pain, dysuria, headache, vision changes.     On arrival, pt is supine in bed. Pt is in no apparent distress and is agreeable to therapy.     Current Problem:  Patient Active Problem List   Diagnosis    Feet turned out, congenital    Major depression, recurrent, chronic (CMS/HCC)    Hammertoe, bilateral    Fat pad  atrophy of foot    Xerosis cutis    Anxiety and depression    Unspecified dementia, mild, without behavioral disturbance, psychotic disturbance, mood disturbance, and anxiety (CMS/HCC)    Coronary artery disease involving native coronary artery of native heart without angina pectoris    HTN (hypertension)    Change in mental state       General Visit Information:  General  Reason for Referral: stroke  Referred By: Dr. Shorty Yeung  Past Medical History Relevant to Rehab: HTN, CAD, Alzheimers  Co-Treatment: OT  Prior to Session Communication: Bedside nurse  Patient Position Received: Bed, 3 rail up, Alarm on    Home Living:  Patient lives in senior apartments alone. She has a bath tub shower.     Prior Level of Function:  She reports independence with mobility and ADLS. No falls or use of assistive devices. No longer drives and will ambulate to store/errands with a rolling cart.     Precautions:  Precautions  Medical Precautions: Fall precautions      Objective     Pain:  Pain Assessment  Pain Score: 0 - No pain    Cognition:  Cognition  Orientation Level: Oriented X4    General Assessments:      Activity Tolerance  Endurance: Endurance does not limit participation in activity                 Static Sitting Balance  Static Sitting-Comment/Number of Minutes: good  Dynamic Sitting Balance  Dynamic Sitting-Comments: good  Static Standing Balance  Static Standing-Comment/Number of Minutes: good  Dynamic Standing Balance  Dynamic Standing-Comments: good    Functional Assessments:  Bed mobility  Supine to sit: mod I  Sit to supine: mod I    Transfers (no external cueing needed)  Sit to stand: mod I  Toilet tx: indep with standard toilet     Ambulation   Pt ambulated 15 ft with mod I and no assistive device.       Extremity/Trunk Assessments:  BLE grossly WFL; patient able to walk without direct assistance, pain, or LOB    Outcome Measures:  [facility] Basic Mobility  Turning from your back to your side while in a flat bed  without using bedrails: None  Moving from lying on your back to sitting on the side of a flat bed without using bedrails: None  Moving to and from bed to chair (including a wheelchair): None  Standing up from a chair using your arms (e.g. wheelchair or bedside chair): None  To walk in hospital room: None  Climbing 3-5 steps with railing: None  Basic Mobility - Total Score: 24

## 2024-03-25 VITALS
HEIGHT: 66 IN | RESPIRATION RATE: 17 BRPM | HEART RATE: 62 BPM | SYSTOLIC BLOOD PRESSURE: 150 MMHG | OXYGEN SATURATION: 97 % | DIASTOLIC BLOOD PRESSURE: 73 MMHG | BODY MASS INDEX: 21.05 KG/M2 | WEIGHT: 130.95 LBS | TEMPERATURE: 97.7 F

## 2024-03-25 LAB
ANION GAP SERPL CALC-SCNC: 10 MMOL/L (ref 10–20)
BASOPHILS # BLD AUTO: 0.03 X10*3/UL (ref 0–0.1)
BASOPHILS NFR BLD AUTO: 0.6 %
BUN SERPL-MCNC: 19 MG/DL (ref 6–23)
CALCIUM SERPL-MCNC: 8.7 MG/DL (ref 8.6–10.3)
CHLORIDE SERPL-SCNC: 105 MMOL/L (ref 98–107)
CO2 SERPL-SCNC: 27 MMOL/L (ref 21–32)
CREAT SERPL-MCNC: 0.76 MG/DL (ref 0.5–1.05)
EGFRCR SERPLBLD CKD-EPI 2021: 82 ML/MIN/1.73M*2
EOSINOPHIL # BLD AUTO: 0.15 X10*3/UL (ref 0–0.4)
EOSINOPHIL NFR BLD AUTO: 3.2 %
ERYTHROCYTE [DISTWIDTH] IN BLOOD BY AUTOMATED COUNT: 12.1 % (ref 11.5–14.5)
GLUCOSE BLD MANUAL STRIP-MCNC: 86 MG/DL (ref 74–99)
GLUCOSE BLD MANUAL STRIP-MCNC: 93 MG/DL (ref 74–99)
GLUCOSE SERPL-MCNC: 85 MG/DL (ref 74–99)
HCT VFR BLD AUTO: 37 % (ref 36–46)
HGB BLD-MCNC: 12.1 G/DL (ref 12–16)
IMM GRANULOCYTES # BLD AUTO: 0.01 X10*3/UL (ref 0–0.5)
IMM GRANULOCYTES NFR BLD AUTO: 0.2 % (ref 0–0.9)
LYMPHOCYTES # BLD AUTO: 0.78 X10*3/UL (ref 0.8–3)
LYMPHOCYTES NFR BLD AUTO: 16.5 %
MCH RBC QN AUTO: 30.8 PG (ref 26–34)
MCHC RBC AUTO-ENTMCNC: 32.7 G/DL (ref 32–36)
MCV RBC AUTO: 94 FL (ref 80–100)
MONOCYTES # BLD AUTO: 0.49 X10*3/UL (ref 0.05–0.8)
MONOCYTES NFR BLD AUTO: 10.3 %
NEUTROPHILS # BLD AUTO: 3.28 X10*3/UL (ref 1.6–5.5)
NEUTROPHILS NFR BLD AUTO: 69.2 %
NRBC BLD-RTO: 0 /100 WBCS (ref 0–0)
PLATELET # BLD AUTO: 169 X10*3/UL (ref 150–450)
POTASSIUM SERPL-SCNC: 4.1 MMOL/L (ref 3.5–5.3)
RBC # BLD AUTO: 3.93 X10*6/UL (ref 4–5.2)
SODIUM SERPL-SCNC: 138 MMOL/L (ref 136–145)
WBC # BLD AUTO: 4.7 X10*3/UL (ref 4.4–11.3)

## 2024-03-25 PROCEDURE — 85025 COMPLETE CBC W/AUTO DIFF WBC: CPT | Performed by: NURSE PRACTITIONER

## 2024-03-25 PROCEDURE — G0378 HOSPITAL OBSERVATION PER HR: HCPCS

## 2024-03-25 PROCEDURE — 82947 ASSAY GLUCOSE BLOOD QUANT: CPT | Mod: 59

## 2024-03-25 PROCEDURE — 82947 ASSAY GLUCOSE BLOOD QUANT: CPT

## 2024-03-25 PROCEDURE — 36415 COLL VENOUS BLD VENIPUNCTURE: CPT | Performed by: NURSE PRACTITIONER

## 2024-03-25 PROCEDURE — 2500000002 HC RX 250 W HCPCS SELF ADMINISTERED DRUGS (ALT 637 FOR MEDICARE OP, ALT 636 FOR OP/ED): Performed by: NURSE PRACTITIONER

## 2024-03-25 PROCEDURE — 80048 BASIC METABOLIC PNL TOTAL CA: CPT | Performed by: NURSE PRACTITIONER

## 2024-03-25 PROCEDURE — 2500000001 HC RX 250 WO HCPCS SELF ADMINISTERED DRUGS (ALT 637 FOR MEDICARE OP): Performed by: NURSE PRACTITIONER

## 2024-03-25 RX ADMIN — SERTRALINE HYDROCHLORIDE 100 MG: 100 TABLET ORAL at 08:13

## 2024-03-25 RX ADMIN — ASPIRIN 81 MG: 81 TABLET, COATED ORAL at 08:13

## 2024-03-25 RX ADMIN — BUPROPION HYDROCHLORIDE 75 MG: 75 TABLET, FILM COATED ORAL at 08:13

## 2024-03-25 RX ADMIN — LISINOPRIL 2.5 MG: 2.5 TABLET ORAL at 08:13

## 2024-03-25 RX ADMIN — CYANOCOBALAMIN TAB 1000 MCG 1000 MCG: 1000 TAB at 08:13

## 2024-03-25 ASSESSMENT — PAIN - FUNCTIONAL ASSESSMENT: PAIN_FUNCTIONAL_ASSESSMENT: 0-10

## 2024-03-25 ASSESSMENT — PAIN SCALES - GENERAL: PAINLEVEL_OUTOF10: 0 - NO PAIN

## 2024-03-25 NOTE — PROGRESS NOTES
03/25/24 1108   Discharge Planning   Living Arrangements Alone   Support Systems Family members   Assistance Needed Resources discussed with brother Jackson Taylor   Type of Residence Private residence  (Senior living independent apartment)   Number of Stairs to Enter Residence 0   Number of Stairs Within Residence 0   Do you have animals or pets at home? No   Patient expects to be discharged to: home to her senior citizens apartment   Does the patient need discharge transport arranged? No     Received request to discuss resources with pt brothmadhu Taylor ([phone]).  Explained and discussed [facility] services, NH placement and assisted living along with payment for each.  Offered and discussed referral to the Passport program and Medicaid, however, pt does not qualify financially as of this time.  Discussed assisted living finder resources such as [facility] and services that they offer and can provide at this time.  Mr. Taylor declined at this time as he feels luke the pt is not ready at this point in time.  Discussed Private Duty Care also.  Mr. Taylor lives nearby the pt ([facility]) and he checks on her frequently.  Pt lives in a senior citizens apartment building that is directly next to the [place] police station.  Discussed meal preporation or need for home delivered meals. Mr. Taylor shared that the pt will walk to the local [employer] [place] that is less then a half mile away because she loves walk to get her own groceries.  At this time, answered all questions and provided Mr. Taylor information.  Mr. Taylor has this worker's number should he feel he has any other needs or questions.

## 2024-03-25 NOTE — DISCHARGE SUMMARY
Inpatient Discharge Summary    BRIEF OVERVIEW  Admitting Provider: Shorty Yeung MD  Discharge Provider: Aparna Landers MD  Primary Care Physician at Discharge: No primary care provider on file. None     Admission Date: 3/23/2024     Discharge Date: 3/25/2024    Primary Discharge Diagnosis  Dementia    Discharge Disposition  Home    Discharge Condition:   stable    Hospital Course     I am assuming care of patient from Dr. FUNMILAYO Yeung.  No overnight events.  Patient resting comfortably in chair at time of my evaluation.  She is currently alert and oriented x 3 although nurse reports she is forgetful.  Patient has no complaints except expresses frustration about being in the hospital.  Reports she was put in here by her brother.      Prior notes reviewed-patient brought to hospital for evaluation given progressive memory decline in setting of dementia.  She currently lives in a senior apartment alone.      No acute abnormalities found on admission.  Urinalysis negative.  CT brain unremarkable.  She was evaluated by neurology who did not feel any further neurologic workup was indicated.  She was also seen by psychiatry who recommended continuing current home Zoloft and Wellbutrin.      PT OT did not identify any skilled needs.   spoke with patient's brother who is interested in home health care (ordered);  also provided him with [facility] contact information who can assist him with further needs after discharge including assisted living if interested in that.      No further inpatient medical needs at this time.  Patient is medically ready for discharge home today.  Follow-up with PCP within 7 days.     Your medication list        CONTINUE taking these medications        Instructions Last Dose Given Next Dose Due   buPROPion 75 mg tablet  Commonly known as: Wellbutrin           cyanocobalamin 1,000 mcg tablet  Commonly known as: Vitamin B-12      Take 1 tablet (1,000 mcg) by mouth once  daily.       donepezil 5 mg tablet  Commonly known as: Aricept      Take 1 tablet (5 mg) by mouth once daily at bedtime.       lisinopril 2.5 mg tablet           sertraline 100 mg tablet  Commonly known as: Zoloft           simvastatin 40 mg tablet  Commonly known as: Zocor                  STOP taking these medications      diazePAM 5 mg tablet  Commonly known as: Valium        LORazepam 0.5 mg tablet  Commonly known as: Ativan                 Outpatient Follow-Up  Future Appointments   Date Time Provider Department Center   3/26/2024  3:30 PM CONOR Obrien, [id number] [place]   8/20/2024  3:00 PM Mathieu Ramirez MD [id number]       Referrals and Follow-ups to Schedule       Referral to Home Health      Special Instructions:  [facility]    I attest that I or another qualified licensed provider saw Diomedes Taylor 90 days prior to or 30 days post admission and this face to face encounter meets the necessary Home Health requirements. The face to face encounter occurred on (date) 3/25/24.    The encounter with the patient was in whole, or in part, for the following medical condition, which is the primary reason for home health care. (List medical condition) cognitive impairment, dementia with delirium, anxiety requiring assistance from others to remind regarding personal care.     I certify that, based on my findings, the following services are medically necessary skilled home health services: Other: Assist with personal care.    Further, I certify that my clinical findings support this patient's homebound status (i.e. absences from home require considerable and taxing effort, are for health treatment, or for attendance at [facility] events; absences from home for nonmedical reasons are infrequent or are of relatively short duration).    The clinical findings that support the need for home care and homebound status are due to cognitive impairment and need for support and reminder of personal care to  "prevent decline in health status.  There exists a normal inability to leave home and leaving home requires a considerable and taxing effort as pt is unable to drivepatient diagnoses cognitive impairment and anxiety and inability to drive.    On what date was the Face to Face Encounter?: 3/25/2024    Additional providers who completed a face to face evaluation of the patient: LILLY REDDY    Disciplines Requested: Home Health Aide    Physician to follow patient's care: PCP    Homebound Status: Yes     If the patient is being referred to  Home Care, order as Internal, and the order will automatically be routed.  If the patient is going to another facility, select Outgoing. A requisition will print, and you must fax it to the destination.       CMS requires a specific narrative when requesting home care services re: Homebound status and medical necessity.  Homebound status: Narrative must explain why the patient is homebound.  For example, because of illness or injury, need the aid of  supportive devices such as crutches, canes, wheelchairs and walkers; the use of special transportation; or the assistance of another person in order to leave their place of residence, or have a condition such that leaving his or her home is medically  contraindicated.  There must exist a normal inability to leave home and leaving home must require a considerable and taxing  effort.             Test Results Pending at Discharge  Pending Labs       No current pending labs.              Physical Exam at Discharge:  /73 (BP Location: Left arm, Patient Position: Lying)   Pulse 62   Temp 36.5 °C (97.7 °F) (Temporal)   Resp 17   Ht 1.676 m (5' 6\")   Wt 59.4 kg (130 lb 15.3 oz)   SpO2 97%   BMI 21.14 kg/m²   GENERAL: No acute distress.  EYES: Extraocular movements intact.  No scleral icterus.  EARS:  External ears normal.  HEAD: Normocephalic and atraumatic.  NECK: Supple neck.  HEART: Regular rate.      LUNGS: Chest clear to " auscultation bilaterally.    ABDOMEN: Soft, non-tender, non-distended.      EXTREMITIES: No cyanosis or edema.  SKIN: Warm and dry.  No rashes.    NEUROLOGIC: Alert and oriented x3.    PSYCHIATRIC: Appropriate mood and affect.    Aparna Landers MD  35 minutes spent on discharge    03/25/24  12:59 PM

## 2024-03-25 NOTE — PROGRESS NOTES
03/25/24 0942   Discharge Planning   Patient expects to be discharged to: home vs SNF     Spoke to patient's brother, Jackson on the phone. He states that he has been the one helping with most of patient's care and getting her to appointments. He states that patient lives at home alone and has been more forgetful so he is concerned about her being home alone. PT noryal recommends low intensity with an ampac of 24. Spoke to him about care patrol and sent referral. Brother states that when she is discharged home he will be able to come get her.  consulted.    1005: Spoke to Stu at [facility], he stated that he will have to rep in patient's area call her brother Jackson.     1045: Message to Dr Landers for [facility] orders per request from brother.     1400: Returned phone call to son Luis, updated him with Care Port information for referral assistance for possible assistive living options with memory care. I also informed patient does not have a skilled need for home care and referral was cancelled. He verbalized understand.     1545: Call to KIANA Perry,TCC from Dr. Yeung with concerns regarding brother not being notified by us prior to discharge. I was not aware that patient left/discharge with a friend of the patient. I did speak with the son earlier and he was aware patient was discharging and would not qualify for home care due to no skilled need. The patient called her friend to pick her up and discharge was reviewed by bedside nurse.  Vicky attempted to call patient's brother, voice mail left to call if he had any further questions.     03/26/23 0930: Late entry for 03/25/23 1605: Brother called back: He was upset that we discharged his sister home without his knowledge. I attempted to explain her son was aware of her discharge, and notified there was no skillable need for home health care and home care would not be able to see. He verbalized understanding. He was angry with staff and felt we should not  have discharged her without his consent and notification. I spoke with bedside nurse and she confirmed patient called her friend to come pick her up, and told her she did not want her brother called at discharge. Bedside nurse verbalized patient was alert and oriented and understood her instructions and care for discharge. I tried to explain to him we could not hold a patient, but he would not allow me to talk and did hang up on me.

## 2024-04-06 LAB
ATRIAL RATE: 53 BPM
P AXIS: 62 DEGREES
P OFFSET: 186 MS
P ONSET: 140 MS
PR INTERVAL: 172 MS
Q ONSET: 226 MS
QRS COUNT: 9 BEATS
QRS DURATION: 126 MS
QT INTERVAL: 504 MS
QTC CALCULATION(BAZETT): 472 MS
QTC FREDERICIA: 483 MS
R AXIS: 43 DEGREES
T AXIS: 50 DEGREES
T OFFSET: 478 MS
VENTRICULAR RATE: 53 BPM

## 2024-04-09 ENCOUNTER — CLINICAL SUPPORT (OUTPATIENT)
Dept: AUDIOLOGY | Facility: CLINIC | Age: 74
End: 2024-04-09
Payer: COMMERCIAL

## 2024-04-09 DIAGNOSIS — H90.3 BILATERAL SENSORINEURAL HEARING LOSS: Primary | ICD-10-CM

## 2024-04-09 PROCEDURE — 92557 COMPREHENSIVE HEARING TEST: CPT

## 2024-04-09 PROCEDURE — 92550 TYMPANOMETRY & REFLEX THRESH: CPT

## 2024-04-09 ASSESSMENT — PAIN SCALES - GENERAL: PAINLEVEL_OUTOF10: 0 - NO PAIN

## 2024-04-09 NOTE — PROGRESS NOTES
AUDIOLOGIC EVALUATION  Name: Diomedes Taylor  YOB: 1950  MRN: [id number]  Age: 73 y.o.    Date of Evaluation:  4/9/2024    History:  Diomedes Taylor, 73 y.o., was seen today for a hearing evaluation. She is accompanied to today's appointment by her brother. They reported concerns that her hearing loss is contributing to her cognitive impairment. The patient reported hearing loss for several years that is equal between her ears. She noted that her hearing is worse when she is congested. She has hearing aids, but she stated that she does not wear them. She noted intermittent dizziness when she gets out of bed. She denied previous otologic surgery and tinnitus.     Evaluation:    Otoscopy  Mild cerumen bilaterally    Tympanometry  Right ear: Type A, normal ear canal volume and compliance.  Left ear: Type A, normal ear canal volume and compliance.    Acoustic Reflexes  Right ear:  Ipsilateral acoustic reflexes were present 500 - 2000 Hz (no response at 4000 Hz).   Left ear:  Ipsilateral acoustic reflexes were present 500 - 2000 Hz (no response at 4000 Hz).     Audiometric Evaluation  Right ear: mild sloping to severe sensorineural hearing loss. Word recognition ability estimated to be good (80%) at 85 dB HL based on an NU-6 recorded 25-word list.  Left ear: mild sloping to severe sensorineural hearing loss. Word recognition ability estimated to be excellent (100%) at 80 dB HL based on an NU-6 recorded ordered by difficulty 10-word list.    Binaural word recognition was completed to demonstrate patient benefit through use of amplification.    The test results were discussed with the patient and her brother.    Impressions  Today's evaluation revealed a mild sloping to severe sensorineural hearing loss in both ears. Word recognition abilities were measured to be good in the right ear and excellent in the left ear. Tympanograms were type A (normal) bilaterally.     The patient brought [employer] hearing aids that  she obtained through [facility]. The hearing aids were not charged and could not be checked. They were encouraged to follow-up with the audiologist who provided the hearing aids for a hearing aid check. They were given a copy of today's audiogram.     Recommendations  - Continue medical follow-up with established providers   - Re-test hearing annually  - Return to [facility] for a hearing aid check    Time: 8191-2651    CONOR Obrien, CCC-A  Licensed Audiologist

## 2024-04-15 ENCOUNTER — OFFICE VISIT (OUTPATIENT)
Dept: PODIATRY | Facility: CLINIC | Age: 74
End: 2024-04-15
Payer: COMMERCIAL

## 2024-04-15 DIAGNOSIS — L85.3 XEROSIS CUTIS: ICD-10-CM

## 2024-04-15 DIAGNOSIS — M20.41 HAMMERTOE, BILATERAL: ICD-10-CM

## 2024-04-15 DIAGNOSIS — M20.42 HAMMERTOE, BILATERAL: ICD-10-CM

## 2024-04-15 DIAGNOSIS — L90.9 FAT PAD ATROPHY OF FOOT: Primary | ICD-10-CM

## 2024-04-15 PROCEDURE — 1160F RVW MEDS BY RX/DR IN RCRD: CPT | Performed by: PODIATRIST

## 2024-04-15 PROCEDURE — 99213 OFFICE O/P EST LOW 20 MIN: CPT | Performed by: PODIATRIST

## 2024-04-15 PROCEDURE — 1159F MED LIST DOCD IN RCRD: CPT | Performed by: PODIATRIST

## 2024-04-15 NOTE — PROGRESS NOTES
History Of Present Illness  Diomedes Taylor is a 73 y.o. female presenting with chief complaint of: bunion left foot. Patient complains of painful ambulation. Poor memory.      Past Medical History  She has a past medical history of Anxiety and depression (01/24/2021), Coronary artery disease involving native coronary artery of native heart without angina pectoris (02/17/2021), HTN (hypertension) (03/23/2024), and Major depression, recurrent, chronic (CMS-HCC) (10/10/2023).    Surgical History  She has a past surgical history that includes Hysterectomy.     Social History  She reports that she has never smoked. She has never used smokeless tobacco. She reports that she does not currently use alcohol. She reports that she does not use drugs.    Family History  Family History   Problem Relation Name Age of Onset    Dementia Mother      Dementia Father          Allergies  Patient has no known allergies.    Medications  Current Outpatient Medications   Medication Sig Dispense Refill    buPROPion (Wellbutrin) 75 mg tablet Take 1 tablet (75 mg) by mouth 2 times a day.      cyanocobalamin (Vitamin B-12) 1,000 mcg tablet Take 1 tablet (1,000 mcg) by mouth once daily. 30 tablet 11    donepezil (Aricept) 5 mg tablet Take 1 tablet (5 mg) by mouth once daily at bedtime. 90 tablet 3    lisinopril 2.5 mg tablet Take 1 tablet (2.5 mg) by mouth once daily.      sertraline (Zoloft) 100 mg tablet Take 1 tablet (100 mg) by mouth once daily.      simvastatin (Zocor) 40 mg tablet Take 1 tablet (40 mg) by mouth once daily at bedtime.       No current facility-administered medications for this visit.       Review of Systems    REVIEW OF SYSTEMS  GENERAL:  Negative for malaise, significant weight loss, fever  CARDIOVASCULAR: leg swelling   MUSCULOSKELETAL:  Negative for joint pain or swelling, back pain, and muscle pain.  SKIN:  Negative for lesions, rash, and itching  PSYCH:  Negative for sleep disturbance, mood disorder and recent  psychosocial stressors  NEURO: Negative, denies any burning, tingling or numbness     Objective: shuffling gait   Vasc: DP and PT pulses are palpable bilateral.  CFT is less than 3 seconds bilateral.  Skin temperature is warm to cool proximal to distal bilateral.      Neuro:  Light touch is intact to the foot bilateral.     Derm: Nails are fungal. Skin is not supple with abnormal texture and turgor noted.  Webspaces are clean, dry and intact bilateral.  Patient has areas of pressure buildup subfirst metatarsal head bilateral as well as second metatarsal heads.  She does have callus at tips of her second toes.    Ortho: Muscle strength is 5/5 for all pedal groups tested.  Patient has significant fat pad atrophy.  She has digital clawing of her toes.  She has plantarflexed second metatarsal heads.  She has residual bunion deformity bilateral.    Assessment/Plan     Diagnoses and all orders for this visit:  Fat pad atrophy of foot  Xerosis cutis  Hammertoe, bilateral      Callus buildup removed.  Patient is to continue to moisturize her feet.  She is wearing well-padded inserts at this time.  This should help alleviate some of her discomfort.  Patient can follow-up as needed.

## 2024-06-29 NOTE — CARE PLAN
Problem: Chronic Conditions and Co-morbidities  Goal: Patient's chronic conditions and co-morbidity symptoms are monitored and maintained or improved  Outcome: Progressing  Flowsheets (Taken 3/23/2024 1623)  Care Plan - Patient's Chronic Conditions and Co-Morbidity Symptoms are Monitored and Maintained or Improved: Collaborate with multidisciplinary team to address chronic and comorbid conditions and prevent exacerbation or deterioration     Problem: Discharge Planning  Goal: Discharge to home or other facility with appropriate resources  Outcome: Progressing     Problem: Safety - Adult  Goal: Free from fall injury  Outcome: Progressing  Flowsheets (Taken 3/23/2024 1623)  Free from fall injury: Instruct family/caregiver on patient safety     Problem: Pain - Adult  Goal: Verbalizes/displays adequate comfort level or baseline comfort level  Outcome: Progressing  Flowsheets (Taken 3/23/2024 1623)  Verbalizes/displays adequate comfort level or baseline comfort level:   Encourage patient to monitor pain and request assistance   Administer analgesics based on type and severity of pain and evaluate response   The patient's goals for the shift include      The clinical goals for the shift include Imaging       Warm/Dry

## 2024-08-20 ENCOUNTER — OFFICE VISIT (OUTPATIENT)
Dept: NEUROLOGY | Facility: HOSPITAL | Age: 74
End: 2024-08-20
Payer: COMMERCIAL

## 2024-08-20 VITALS
WEIGHT: 123.46 LBS | DIASTOLIC BLOOD PRESSURE: 78 MMHG | RESPIRATION RATE: 17 BRPM | TEMPERATURE: 98.6 F | SYSTOLIC BLOOD PRESSURE: 132 MMHG | HEART RATE: 81 BPM | HEIGHT: 66 IN | BODY MASS INDEX: 19.84 KG/M2

## 2024-08-20 DIAGNOSIS — F03.B0 MODERATE DEMENTIA, UNSPECIFIED DEMENTIA TYPE, UNSPECIFIED WHETHER BEHAVIORAL, PSYCHOTIC, OR MOOD DISTURBANCE OR ANXIETY (MULTI): Primary | ICD-10-CM

## 2024-08-20 PROCEDURE — 1126F AMNT PAIN NOTED NONE PRSNT: CPT | Performed by: PSYCHIATRY & NEUROLOGY

## 2024-08-20 PROCEDURE — 3008F BODY MASS INDEX DOCD: CPT | Performed by: PSYCHIATRY & NEUROLOGY

## 2024-08-20 PROCEDURE — 3078F DIAST BP <80 MM HG: CPT | Performed by: PSYCHIATRY & NEUROLOGY

## 2024-08-20 PROCEDURE — 99214 OFFICE O/P EST MOD 30 MIN: CPT | Performed by: PSYCHIATRY & NEUROLOGY

## 2024-08-20 PROCEDURE — G2211 COMPLEX E/M VISIT ADD ON: HCPCS | Performed by: PSYCHIATRY & NEUROLOGY

## 2024-08-20 PROCEDURE — 99417 PROLNG OP E/M EACH 15 MIN: CPT | Performed by: PSYCHIATRY & NEUROLOGY

## 2024-08-20 PROCEDURE — 3075F SYST BP GE 130 - 139MM HG: CPT | Performed by: PSYCHIATRY & NEUROLOGY

## 2024-08-20 RX ORDER — DONEPEZIL HYDROCHLORIDE 10 MG/1
TABLET, FILM COATED ORAL
Qty: 90 TABLET | Refills: 3 | Status: SHIPPED | OUTPATIENT
Start: 2024-08-20

## 2024-08-20 ASSESSMENT — PAIN SCALES - GENERAL: PAINLEVEL: 0-NO PAIN
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
## 2024-08-20 NOTE — PATIENT INSTRUCTIONS
You were seen today by Dr. Ramirez.  It is a pleasure seeing you.    Given the history and today's evaluation, I suspect moderate dementia without behavioral disturbance.       PLAN:  Increase the dose of Aricept to 10 mg daily  Continue vitamin B12 supplementation, follow-up with PCP regarding vitamin B12 levels  I agree with the plan of moving to assisted-living facility, this would provide more supervision  I recommended to optimize hearing, use ramiro aids consistently   Follow-up in 6 months or earlier if needed.

## 2024-09-16 ENCOUNTER — APPOINTMENT (OUTPATIENT)
Dept: PODIATRY | Facility: CLINIC | Age: 74
End: 2024-09-16
Payer: COMMERCIAL

## 2024-09-16 DIAGNOSIS — L90.9 FAT PAD ATROPHY OF FOOT: Primary | ICD-10-CM

## 2024-09-16 DIAGNOSIS — M20.12 HALLUX VALGUS OF LEFT FOOT: ICD-10-CM

## 2024-09-16 PROCEDURE — 99213 OFFICE O/P EST LOW 20 MIN: CPT | Performed by: PODIATRIST

## 2024-09-16 PROCEDURE — 1159F MED LIST DOCD IN RCRD: CPT | Performed by: PODIATRIST

## 2024-09-16 PROCEDURE — 1160F RVW MEDS BY RX/DR IN RCRD: CPT | Performed by: PODIATRIST

## 2024-09-16 NOTE — PROGRESS NOTES
History Of Present Illness  Diomedes Taylor is a 74 y.o. female presenting with chief complaint of: bunion & callus left foot.    PCP Shorty Yenug MD  Last visit 5/22/24     Past Medical History  She has a past medical history of Anxiety and depression (01/24/2021), Coronary artery disease involving native coronary artery of native heart without angina pectoris (02/17/2021), HTN (hypertension) (03/23/2024), and Major depression, recurrent, chronic (CMS-HCC) (10/10/2023).    Surgical History  She has a past surgical history that includes Hysterectomy.     Social History  She reports that she has never smoked. She has never used smokeless tobacco. She reports that she does not currently use alcohol. She reports that she does not use drugs.    Family History  Family History   Problem Relation Name Age of Onset    Dementia Mother      Dementia Father          Allergies  Patient has no known allergies.    Medications  Current Outpatient Medications   Medication Sig Dispense Refill    buPROPion (Wellbutrin) 75 mg tablet Take 1 tablet (75 mg) by mouth 2 times a day.      cyanocobalamin (Vitamin B-12) 1,000 mcg tablet Take 1 tablet (1,000 mcg) by mouth once daily. 30 tablet 11    donepezil (Aricept) 10 mg tablet Take one tab daily 90 tablet 3    lisinopril 2.5 mg tablet Take 1 tablet (2.5 mg) by mouth once daily.      sertraline (Zoloft) 100 mg tablet Take 1 tablet (100 mg) by mouth once daily.      simvastatin (Zocor) 40 mg tablet Take 1 tablet (40 mg) by mouth once daily at bedtime.       No current facility-administered medications for this visit.       Review of Systems    REVIEW OF SYSTEMS  GENERAL:  Negative for malaise, significant weight loss, fever  CARDIOVASCULAR: leg swelling   MUSCULOSKELETAL:  Negative for joint pain or swelling, back pain, and muscle pain.  SKIN:  Negative for lesions, rash, and itching  PSYCH:  Negative for sleep disturbance, mood disorder and recent psychosocial stressors  NEURO: Negative,  denies any burning, tingling or numbness     Objective:   Vasc: DP and PT pulses are palpable bilateral.  CFT is less than 3 seconds bilateral.  Skin temperature is warm to cool proximal to distal bilateral.      Neuro:  Light touch is intact to the foot bilateral.  .  There is no clonus noted.  The hallux is downgoing bilateral.      Derm: Nails are normal. Skin is supple with normal texture and turgor noted.  Webspaces are clean, dry and intact bilateral.  Sub left first met head: Patient has lesion (s) on plantar feet that are punctate with nucleated deep painful core  Ortho: Muscle strength is 5/5 for all pedal groups tested. Pes planus, digital clawing, atrophy of plantar fat pad . HAV left foot   Assessment/Plan     Diagnoses and all orders for this visit:  Fat pad atrophy of foot  Hallux valgus of left foot      Lesion is excised  Go to [employer] for new shoes

## 2025-02-05 ENCOUNTER — APPOINTMENT (OUTPATIENT)
Dept: RADIOLOGY | Facility: HOSPITAL | Age: 75
End: 2025-02-05
Payer: COMMERCIAL

## 2025-02-05 ENCOUNTER — APPOINTMENT (OUTPATIENT)
Dept: CARDIOLOGY | Facility: HOSPITAL | Age: 75
End: 2025-02-05
Payer: COMMERCIAL

## 2025-02-05 ENCOUNTER — HOSPITAL ENCOUNTER (EMERGENCY)
Facility: HOSPITAL | Age: 75
Discharge: HOME | End: 2025-02-05
Attending: EMERGENCY MEDICINE
Payer: COMMERCIAL

## 2025-02-05 VITALS
HEART RATE: 67 BPM | SYSTOLIC BLOOD PRESSURE: 129 MMHG | BODY MASS INDEX: 18.32 KG/M2 | DIASTOLIC BLOOD PRESSURE: 65 MMHG | HEIGHT: 66 IN | WEIGHT: 114 LBS | TEMPERATURE: 97.9 F | OXYGEN SATURATION: 97 % | RESPIRATION RATE: 17 BRPM

## 2025-02-05 DIAGNOSIS — U07.1 COVID-19: Primary | ICD-10-CM

## 2025-02-05 DIAGNOSIS — R11.2 NAUSEA AND VOMITING, UNSPECIFIED VOMITING TYPE: ICD-10-CM

## 2025-02-05 LAB
ALBUMIN SERPL BCP-MCNC: 4.2 G/DL (ref 3.4–5)
ALP SERPL-CCNC: 67 U/L (ref 33–136)
ALT SERPL W P-5'-P-CCNC: 13 U/L (ref 7–45)
ANION GAP SERPL CALC-SCNC: 11 MMOL/L (ref 10–20)
APPEARANCE UR: CLEAR
AST SERPL W P-5'-P-CCNC: 16 U/L (ref 9–39)
BASOPHILS # BLD AUTO: 0.01 X10*3/UL (ref 0–0.1)
BASOPHILS NFR BLD AUTO: 0.2 %
BILIRUB SERPL-MCNC: 0.5 MG/DL (ref 0–1.2)
BILIRUB UR STRIP.AUTO-MCNC: NEGATIVE MG/DL
BUN SERPL-MCNC: 29 MG/DL (ref 6–23)
CALCIUM SERPL-MCNC: 9.1 MG/DL (ref 8.6–10.3)
CARDIAC TROPONIN I PNL SERPL HS: 4 NG/L (ref 0–13)
CHLORIDE SERPL-SCNC: 99 MMOL/L (ref 98–107)
CO2 SERPL-SCNC: 33 MMOL/L (ref 21–32)
COLOR UR: YELLOW
CREAT SERPL-MCNC: 1.16 MG/DL (ref 0.5–1.05)
EGFRCR SERPLBLD CKD-EPI 2021: 49 ML/MIN/1.73M*2
EOSINOPHIL # BLD AUTO: 0.05 X10*3/UL (ref 0–0.4)
EOSINOPHIL NFR BLD AUTO: 1 %
ERYTHROCYTE [DISTWIDTH] IN BLOOD BY AUTOMATED COUNT: 11.6 % (ref 11.5–14.5)
FLUAV RNA RESP QL NAA+PROBE: NOT DETECTED
FLUBV RNA RESP QL NAA+PROBE: NOT DETECTED
GLUCOSE SERPL-MCNC: 163 MG/DL (ref 74–99)
GLUCOSE UR STRIP.AUTO-MCNC: NORMAL MG/DL
HCT VFR BLD AUTO: 40.2 % (ref 36–46)
HGB BLD-MCNC: 13.3 G/DL (ref 12–16)
HOLD SPECIMEN: NORMAL
IMM GRANULOCYTES # BLD AUTO: 0.01 X10*3/UL (ref 0–0.5)
IMM GRANULOCYTES NFR BLD AUTO: 0.2 % (ref 0–0.9)
KETONES UR STRIP.AUTO-MCNC: NEGATIVE MG/DL
LEUKOCYTE ESTERASE UR QL STRIP.AUTO: NEGATIVE
LIPASE SERPL-CCNC: 26 U/L (ref 9–82)
LYMPHOCYTES # BLD AUTO: 0.61 X10*3/UL (ref 0.8–3)
LYMPHOCYTES NFR BLD AUTO: 12.7 %
MCH RBC QN AUTO: 30.3 PG (ref 26–34)
MCHC RBC AUTO-ENTMCNC: 33.1 G/DL (ref 32–36)
MCV RBC AUTO: 92 FL (ref 80–100)
MONOCYTES # BLD AUTO: 0.29 X10*3/UL (ref 0.05–0.8)
MONOCYTES NFR BLD AUTO: 6.1 %
NEUTROPHILS # BLD AUTO: 3.82 X10*3/UL (ref 1.6–5.5)
NEUTROPHILS NFR BLD AUTO: 79.8 %
NITRITE UR QL STRIP.AUTO: NEGATIVE
NRBC BLD-RTO: 0 /100 WBCS (ref 0–0)
PH UR STRIP.AUTO: 6.5 [PH]
PLATELET # BLD AUTO: 152 X10*3/UL (ref 150–450)
POTASSIUM SERPL-SCNC: 3.7 MMOL/L (ref 3.5–5.3)
PROT SERPL-MCNC: 7.2 G/DL (ref 6.4–8.2)
PROT UR STRIP.AUTO-MCNC: NEGATIVE MG/DL
RBC # BLD AUTO: 4.39 X10*6/UL (ref 4–5.2)
RBC # UR STRIP.AUTO: NEGATIVE MG/DL
SARS-COV-2 RNA RESP QL NAA+PROBE: DETECTED
SODIUM SERPL-SCNC: 139 MMOL/L (ref 136–145)
SP GR UR STRIP.AUTO: 1.02
UROBILINOGEN UR STRIP.AUTO-MCNC: NORMAL MG/DL
WBC # BLD AUTO: 4.8 X10*3/UL (ref 4.4–11.3)

## 2025-02-05 PROCEDURE — 2500000004 HC RX 250 GENERAL PHARMACY W/ HCPCS (ALT 636 FOR OP/ED)

## 2025-02-05 PROCEDURE — 36415 COLL VENOUS BLD VENIPUNCTURE: CPT

## 2025-02-05 PROCEDURE — 74176 CT ABD & PELVIS W/O CONTRAST: CPT

## 2025-02-05 PROCEDURE — 99285 EMERGENCY DEPT VISIT HI MDM: CPT | Mod: 25 | Performed by: EMERGENCY MEDICINE

## 2025-02-05 PROCEDURE — 85025 COMPLETE CBC W/AUTO DIFF WBC: CPT

## 2025-02-05 PROCEDURE — 2500000001 HC RX 250 WO HCPCS SELF ADMINISTERED DRUGS (ALT 637 FOR MEDICARE OP)

## 2025-02-05 PROCEDURE — 87636 SARSCOV2 & INF A&B AMP PRB: CPT

## 2025-02-05 PROCEDURE — 93005 ELECTROCARDIOGRAM TRACING: CPT

## 2025-02-05 PROCEDURE — 74176 CT ABD & PELVIS W/O CONTRAST: CPT | Performed by: RADIOLOGY

## 2025-02-05 PROCEDURE — 96374 THER/PROPH/DIAG INJ IV PUSH: CPT

## 2025-02-05 PROCEDURE — 84484 ASSAY OF TROPONIN QUANT: CPT

## 2025-02-05 PROCEDURE — 99285 EMERGENCY DEPT VISIT HI MDM: CPT | Performed by: EMERGENCY MEDICINE

## 2025-02-05 PROCEDURE — 83690 ASSAY OF LIPASE: CPT

## 2025-02-05 PROCEDURE — 81003 URINALYSIS AUTO W/O SCOPE: CPT

## 2025-02-05 PROCEDURE — 84075 ASSAY ALKALINE PHOSPHATASE: CPT

## 2025-02-05 PROCEDURE — 2500000005 HC RX 250 GENERAL PHARMACY W/O HCPCS

## 2025-02-05 PROCEDURE — 96361 HYDRATE IV INFUSION ADD-ON: CPT

## 2025-02-05 RX ORDER — ONDANSETRON 4 MG/1
4 TABLET, ORALLY DISINTEGRATING ORAL EVERY 8 HOURS PRN
Qty: 20 TABLET | Refills: 0 | Status: SHIPPED | OUTPATIENT
Start: 2025-02-05 | End: 2025-02-12

## 2025-02-05 RX ORDER — ALUMINUM HYDROXIDE, MAGNESIUM HYDROXIDE, AND SIMETHICONE 1200; 120; 1200 MG/30ML; MG/30ML; MG/30ML
30 SUSPENSION ORAL ONCE
Status: COMPLETED | OUTPATIENT
Start: 2025-02-05 | End: 2025-02-05

## 2025-02-05 RX ORDER — ONDANSETRON HYDROCHLORIDE 2 MG/ML
4 INJECTION, SOLUTION INTRAVENOUS ONCE
Status: COMPLETED | OUTPATIENT
Start: 2025-02-05 | End: 2025-02-05

## 2025-02-05 RX ORDER — LIDOCAINE HYDROCHLORIDE 20 MG/ML
15 SOLUTION OROPHARYNGEAL ONCE
Status: COMPLETED | OUTPATIENT
Start: 2025-02-05 | End: 2025-02-05

## 2025-02-05 RX ADMIN — ONDANSETRON 4 MG: 2 INJECTION INTRAMUSCULAR; INTRAVENOUS at 11:29

## 2025-02-05 RX ADMIN — ALUMINUM HYDROXIDE, MAGNESIUM HYDROXIDE, AND DIMETHICONE 30 ML: 200; 20; 200 SUSPENSION ORAL at 12:59

## 2025-02-05 RX ADMIN — LIDOCAINE HYDROCHLORIDE 15 ML: 20 SOLUTION ORAL at 12:59

## 2025-02-05 RX ADMIN — SODIUM CHLORIDE, POTASSIUM CHLORIDE, SODIUM LACTATE AND CALCIUM CHLORIDE 1000 ML: 600; 310; 30; 20 INJECTION, SOLUTION INTRAVENOUS at 11:30

## 2025-02-05 ASSESSMENT — LIFESTYLE VARIABLES
EVER HAD A DRINK FIRST THING IN THE MORNING TO STEADY YOUR NERVES TO GET RID OF A HANGOVER: NO
TOTAL SCORE: 0
EVER FELT BAD OR GUILTY ABOUT YOUR DRINKING: NO
HAVE PEOPLE ANNOYED YOU BY CRITICIZING YOUR DRINKING: NO
HAVE YOU EVER FELT YOU SHOULD CUT DOWN ON YOUR DRINKING: NO

## 2025-02-05 ASSESSMENT — COLUMBIA-SUICIDE SEVERITY RATING SCALE - C-SSRS
6. HAVE YOU EVER DONE ANYTHING, STARTED TO DO ANYTHING, OR PREPARED TO DO ANYTHING TO END YOUR LIFE?: NO
1. IN THE PAST MONTH, HAVE YOU WISHED YOU WERE DEAD OR WISHED YOU COULD GO TO SLEEP AND NOT WAKE UP?: NO
2. HAVE YOU ACTUALLY HAD ANY THOUGHTS OF KILLING YOURSELF?: NO

## 2025-02-05 ASSESSMENT — PAIN - FUNCTIONAL ASSESSMENT
PAIN_FUNCTIONAL_ASSESSMENT: 0-10
PAIN_FUNCTIONAL_ASSESSMENT: 0-10

## 2025-02-05 ASSESSMENT — PAIN SCALES - GENERAL
PAINLEVEL_OUTOF10: 7
PAINLEVEL_OUTOF10: 0 - NO PAIN

## 2025-02-05 ASSESSMENT — PAIN DESCRIPTION - LOCATION: LOCATION: ABDOMEN

## 2025-02-05 NOTE — ED TRIAGE NOTES
Patient has had nausea and upper abdominal pain x1.5 weeks.  Denies vomiting or diarrhea.  Poor appetite. Has been seen by PCP for symptoms but sent to ED by PCP if symptoms continued.

## 2025-02-05 NOTE — DISCHARGE INSTRUCTIONS
Your COVID swab was positive    You need to practice good social distancing and quarantine yourself from other people.    Seek immediate medical attention if you develop: worsening cough, difficulty breathing, shortness of breath, nausea, vomiting, diarrhea, fever, weakness, dizziness, chest pain, or any new or worsening symptoms.    Follow-up with your doctor in 1 to 2 days.    Do not take simvastatin while taking Paxlovid.

## 2025-02-05 NOTE — ED PROVIDER NOTES
EMERGENCY DEPARTMENT ENCOUNTER      Pt Name: Diomedes Taylor  MRN: [id number]  Birthdate 1950  Date of evaluation: 2/5/2025  Provider: Fritz Erwin DO    CHIEF COMPLAINT       Chief Complaint   Patient presents with    Abdominal Pain    Nausea         HISTORY OF PRESENT ILLNESS    HPI    74-year-old female with past medical history significant for CAD, hypertension and past surgical history including hysterectomy presenting to the emergency department at the request of her primary care physician Dr. Yeung for acute renal failure secondary to nausea and decreased p.o. intake.  Patient states she has had nausea and decreased p.o. intake for the past week and a half but no vomiting, diarrhea or abdominal pain.  States she is continue to have soft daily bowel movements and denies constipation.  Denies fevers, chills, night sweats, urinary symptoms, chest pain, shortness of breath, syncope/near syncope, diaphoresis, orthopnea, lower extremity pain or swelling, headache, blurry vision.  Patient states she has not had the symptoms in the past.  Denies sick contacts.  States she went to her primary care physician's office on Monday at which time Dr. Yeung ordered labs which showed acute renal failure with a GFR downtrending from patient's baseline of 70 to 39.  Dr. Yenug called patient and told her to go to the emergency department for evaluation.    Nursing Notes were reviewed.    PAST MEDICAL HISTORY     Past Medical History:   Diagnosis Date    Anxiety and depression 01/24/2021    Coronary artery disease involving native coronary artery of native heart without angina pectoris 02/17/2021    HTN (hypertension) 03/23/2024    Major depression, recurrent, chronic (CMS-HCC) 10/10/2023         SURGICAL HISTORY       Past Surgical History:   Procedure Laterality Date    HYSTERECTOMY           CURRENT MEDICATIONS       Discharge Medication List as of 2/5/2025  2:07 PM        CONTINUE these medications which have NOT  CHANGED    Details   buPROPion (Wellbutrin) 75 mg tablet Take 1 tablet (75 mg) by mouth 2 times a day., Historical Med      donepezil (Aricept) 10 mg tablet Take one tab daily, Normal      lisinopril 2.5 mg tablet Take 1 tablet (2.5 mg) by mouth once daily., Historical Med      sertraline (Zoloft) 100 mg tablet Take 1 tablet (100 mg) by mouth once daily., Historical Med      simvastatin (Zocor) 40 mg tablet Take 1 tablet (40 mg) by mouth once daily at bedtime., Historical Med             ALLERGIES     Patient has no known allergies.    FAMILY HISTORY       Family History   Problem Relation Name Age of Onset    Dementia Mother      Dementia Father            SOCIAL HISTORY       Social History     Socioeconomic History    Marital status:    Tobacco Use    Smoking status: Never    Smokeless tobacco: Never   Substance and Sexual Activity    Alcohol use: Not Currently     Comment: occassionally    Drug use: Never     Social Drivers of Health     Financial Resource Strain: Patient Declined (3/23/2024)    Overall Financial Resource Strain (CARDIA)     Difficulty of Paying Living Expenses: Patient declined   Transportation Needs: Patient Declined (3/23/2024)    PRAPARE - Transportation     Lack of Transportation (Medical): Patient declined     Lack of Transportation (Non-Medical): Patient declined   Physical Activity: Insufficiently Active (2/3/2025)    Received from [facility]    Exercise Vital Sign     Days of Exercise per Week: 2 days     Minutes of Exercise per Session: 30 min   Housing Stability: Patient Declined (3/23/2024)    Housing Stability Vital Sign     Unable to Pay for Housing in the Last Year: Patient declined     Number of Places Lived in the Last Year: 0     Unstable Housing in the Last Year: Patient declined       SCREENINGS                        PHYSICAL EXAM    (up to 7 for level 4, 8 or more for level 5)     ED Triage Vitals   Temperature Heart Rate Respirations BP   02/05/25 1024  02/05/25 1024 02/05/25 1024 02/05/25 1024   36.6 °C (97.9 °F) 65 16 102/51      Pulse Ox Temp Source Heart Rate Source Patient Position   02/05/25 1024 02/05/25 1024 02/05/25 1125 02/05/25 1125   99 % Temporal Monitor Sitting      BP Location FiO2 (%)     02/05/25 1125 --     Right arm        Physical Exam  Vitals reviewed.   Constitutional:       General: She is not in acute distress.     Appearance: Normal appearance. She is not ill-appearing, toxic-appearing or diaphoretic.   HENT:      Head: Normocephalic and atraumatic.      Right Ear: External ear normal.      Left Ear: External ear normal.      Nose: Nose normal.      Mouth/Throat:      Pharynx: Oropharynx is clear.   Eyes:      Conjunctiva/sclera: Conjunctivae normal.   Cardiovascular:      Rate and Rhythm: Normal rate and regular rhythm.      Pulses: Normal pulses.      Heart sounds: Normal heart sounds.   Pulmonary:      Effort: Pulmonary effort is normal.      Breath sounds: Normal breath sounds.   Abdominal:      General: Abdomen is flat. There is no distension.      Palpations: Abdomen is soft.      Tenderness: There is no abdominal tenderness. There is no right CVA tenderness, left CVA tenderness, guarding or rebound.   Musculoskeletal:         General: No swelling, tenderness, deformity or signs of injury. Normal range of motion.      Cervical back: Normal range of motion and neck supple.      Right lower leg: No edema.      Left lower leg: No edema.   Skin:     General: Skin is warm and dry.   Neurological:      General: No focal deficit present.      Mental Status: She is alert and oriented to person, place, and time.   Psychiatric:         Mood and Affect: Mood normal.         Behavior: Behavior normal.          DIAGNOSTIC RESULTS     LABS:  Labs Reviewed   CBC WITH AUTO DIFFERENTIAL - Abnormal       Result Value    WBC 4.8      nRBC 0.0      RBC 4.39      Hemoglobin 13.3      Hematocrit 40.2      MCV 92      MCH 30.3      MCHC 33.1      RDW 11.6       Platelets 152      Neutrophils % 79.8      Immature Granulocytes %, Automated 0.2      Lymphocytes % 12.7      Monocytes % 6.1      Eosinophils % 1.0      Basophils % 0.2      Neutrophils Absolute 3.82      Immature Granulocytes Absolute, Automated 0.01      Lymphocytes Absolute 0.61 (*)     Monocytes Absolute 0.29      Eosinophils Absolute 0.05      Basophils Absolute 0.01     COMPREHENSIVE METABOLIC PANEL - Abnormal    Glucose 163 (*)     Sodium 139      Potassium 3.7      Chloride 99      Bicarbonate 33 (*)     Anion Gap 11      Urea Nitrogen 29 (*)     Creatinine 1.16 (*)     eGFR 49 (*)     Calcium 9.1      Albumin 4.2      Alkaline Phosphatase 67      Total Protein 7.2      AST 16      Bilirubin, Total 0.5      ALT 13     SARS-COV-2 AND INFLUENZA A/B PCR - Abnormal    Flu A Result Not Detected      Flu B Result Not Detected      Coronavirus 2019, PCR Detected (*)     Narrative:     This assay is an FDA-cleared, in vitro diagnostic nucleic acid amplification test for the qualitative detection and differentiation of SARS CoV-2/ Influenza A/B from nasopharyngeal specimens collected from individuals with signs and symptoms of respiratory tract infections, and has been validated for use at [facility]. Negative results do not preclude COVID-19/ Influenza A/B infections and should not be used as the sole basis for diagnosis, treatment, or other management decisions. Testing for SARS CoV-2 is recommended only for patients who meet current clinical and/or epidemiological criteria defined by federal, state, or local public health directives.   LIPASE - Normal    Lipase 26      Narrative:     Venipuncture immediately after or during the administration of Metamizole may lead to falsely low results. Testing should be performed immediately prior to Metamizole dosing.   TROPONIN I, HIGH SENSITIVITY - Normal    Troponin I, High Sensitivity 4      Narrative:     Less than 99th percentile of normal  range cutoff-  Female and children under 18 years old <14 ng/L; Male <21 ng/L: Negative  Repeat testing should be performed if clinically indicated.     Female and children under 18 years old 14-50 ng/L; Male 21-50 ng/L:  Consistent with possible cardiac damage and possible increased clinical   risk. Serial measurements may help to assess extent of myocardial damage.     >50 ng/L: Consistent with cardiac damage, increased clinical risk and  myocardial infarction. Serial measurements may help assess extent of   myocardial damage.      NOTE: Children less than 1 year old may have higher baseline troponin   levels and results should be interpreted in conjunction with the overall   clinical context.     NOTE: Troponin I testing is performed using a different   testing methodology at [facility] than at other   [facility]. Direct result comparisons should only   be made within the same method.   URINALYSIS WITH REFLEX CULTURE AND MICROSCOPIC - Normal    Color, Urine Yellow      Appearance, Urine Clear      Specific Gravity, Urine 1.018      pH, Urine 6.5      Protein, Urine NEGATIVE      Glucose, Urine Normal      Blood, Urine NEGATIVE      Ketones, Urine NEGATIVE      Bilirubin, Urine NEGATIVE      Urobilinogen, Urine Normal      Nitrite, Urine NEGATIVE      Leukocyte Esterase, Urine NEGATIVE     URINALYSIS WITH REFLEX CULTURE AND MICROSCOPIC    Narrative:     The following orders were created for panel order Urinalysis with Reflex Culture and Microscopic.  Procedure                               Abnormality         Status                     ---------                               -----------         ------                     Urinalysis with Reflex C...[[id number]]  Normal              Final result               Extra Urine Gray Tube[[id number]]                            In process                   Please view results for these tests on the individual orders.   EXTRA URINE GRAY TUBE       All other  labs were within normal range or not returned as of this dictation.    Imaging  CT abdomen pelvis wo IV contrast   Final Result   1.  No acute significant abnormality.             MACRO:   None        Signed by: Graham Vazquez 2/5/2025 12:32 PM   Dictation workstation:   [id number]           Procedures  Procedures     EMERGENCY DEPARTMENT COURSE/MDM:     ED Course as of 02/05/25 2052   Wed Feb 05, 2025   1206 Labs reviewed.  Patient has an BECKY with a BUN of 29 and creatinine of 1.16 with a metabolic alkalosis and is COVID-positive.  Urinalysis is CT abdomen pelvis without IV contrast pending [CH]      ED Course User Index  [CH] Angelluis ErwinDO         Diagnoses as of 02/05/25 2052   COVID-19   Nausea and vomiting, unspecified vomiting type        Medical Decision Making    74-year-old female with past medical history significant for CAD, hypertension and past surgical history including hysterectomy presenting to the emergency department at the request of her primary care physician Dr. Yeung for acute renal failure secondary to nausea and decreased p.o. intake.  Hemodynamically stable, no acute distress, nontoxic-appearing, afebrile.  Abdominal labs, troponin, EKG and CT abdomen pelvis without IV contrast ordered as well as respiratory swabs.  Patient has a mild BECKY with a BUN of 29 and creatinine of 1.16 likely secondary to intravascular depletion from poor p.o. intake and patient is COVID-positive.  No evidence of acute pathology on CT imaging.  1 L bolus of LR, Zofran and Mylanta ordered after which patient's symptoms improved and she was able to tolerate p.o. challenge with ginger ale.  Given patient is high risk for complications of COVID-19 given her age patient will be sent home on Paxlovid and was instructed to stop taking her statin while taking Paxlovid.  Discharged with strict return precautions for outpatient follow-up with her primary care physician with prescriptions for both Paxlovid and  Zofran.    Patient and or family in agreement and understanding of treatment plan.  All questions answered.      I reviewed the case with the attending ED physician. The attending ED physician agrees with the plan. Patient and/or patient´s representative was counseled regarding labs, imaging, likely diagnosis, and plan. All questions were answered.    =================Attending note===============    The patient was seen by the resident/fellow.  I have personally performed a substantive portion of the encounter.  I have seen and examined the patient; agree with the workup, evaluation, MDM,   management and diagnosis.  The care plan has been discussed with the resident; I have reviewed the resident's note and agree with the documented findings.      This is a 74 y.o. female who presents to ER with nausea and some epigastric discomfort.  She has had it for about a week and a half.  She was seen by her primary care physician and had blood work done yesterday.  Her blood work came back with some acute worsening renal function so her doctor told her to come to the ER.  Her appetites been decreased.  She has been eating and drinking less.  She had some nausea without vomiting or diarrhea.  She been having regular bowel movements.  No fevers.  No urinary issues.    She does have a history of hypertension.  She has had a hysterectomy.  No history of appendectomy or cholecystectomy.    For the resident exam she had no abdominal tenderness.  My exam she had some epigastric tenderness.  No guarding or rebound pain no peritoneal signs.  No CVA tenderness.  Nonsurgical abdomen.  Heart regular.  Lungs clear.  No distress.    Urine does not show signs of infection.  Patient is positive for COVID.  Troponins negative.  Patient had outpatient BUN/creatinine of 31 and 1.4 yesterday.  Today her levels are 29 and 1.6.  This is a significant improvement.  No leukocytosis or anemia.  CT of the abdomen had no acute findings.  No  pancreatitis or cholecystitis or other findings.    Patient is tolerating oral hydration well.  Patient is discharged home with Zofran and Paxlovid.  She is to hold her statin while she is taking the Paxlovid.  She is to follow-up with her doctor.  She is to return to nearest ER for any new or worsening symptoms.  Patient and her son are satisfied this plan.  Son is aware to hold the statin while she is taking Paxlovid.  He does organize her medications for her.        EKG: Sinus bradycardia with a rate of 56.  .  QTc 461.  Right bundle branch block.  No ST changes.    ==========================================      ED Medications administered this visit:    Medications   ondansetron (Zofran) injection 4 mg (4 mg intravenous Given 2/5/25 1129)   lactated Ringer's bolus 1,000 mL (0 mL intravenous Stopped 2/5/25 1242)   alum-mag hydroxide-simeth (Mylanta) 200-200-20 mg/5 mL oral suspension 30 mL (30 mL oral Given 2/5/25 1259)   lidocaine (Xylocaine) 2 % mouth solution 15 mL (15 mL oral Given 2/5/25 1259)       New Prescriptions from this visit:    Discharge Medication List as of 2/5/2025  2:07 PM        START taking these medications    Details   nirmatrelvir-ritonavir (Paxlovid) 150-100 mg tablet therapy pack Take 2 tablets by mouth 2 times a day for 5 days. Follow the instructions on the package, Starting Wed 2/5/2025, Until Mon 2/10/2025, Normal      ondansetron ODT (Zofran-ODT) 4 mg disintegrating tablet Dissolve 1 tablet (4 mg) in the mouth every 8 hours if needed for nausea or vomiting for up to 7 days., Starting Wed 2/5/2025, Until Wed 2/12/2025 at 2359, Normal             Follow-up:  Shorty Yeung MD  [id number] [address] [facility] [address]  [facility] [id number] [phone]    In 1 day          Final Impression:   1. COVID-19    2. Nausea and vomiting, unspecified vomiting type          (Please note that portions of this note were completed with a voice recognition program.  Efforts were made to edit the  dictations but occasionally words are mis-transcribed.)     Fritz Erwin, DO  Resident  02/05/25 2052

## 2025-02-06 LAB
ATRIAL RATE: 56 BPM
P AXIS: 64 DEGREES
P OFFSET: 174 MS
P ONSET: 133 MS
PR INTERVAL: 178 MS
Q ONSET: 222 MS
QRS COUNT: 9 BEATS
QRS DURATION: 130 MS
QT INTERVAL: 478 MS
QTC CALCULATION(BAZETT): 461 MS
QTC FREDERICIA: 467 MS
R AXIS: 50 DEGREES
T AXIS: 36 DEGREES
T OFFSET: 461 MS
VENTRICULAR RATE: 56 BPM

## 2025-02-20 ENCOUNTER — OFFICE VISIT (OUTPATIENT)
Dept: NEUROLOGY | Facility: HOSPITAL | Age: 75
End: 2025-02-20
Payer: COMMERCIAL

## 2025-02-20 VITALS
DIASTOLIC BLOOD PRESSURE: 63 MMHG | SYSTOLIC BLOOD PRESSURE: 100 MMHG | WEIGHT: 107.36 LBS | TEMPERATURE: 97.9 F | RESPIRATION RATE: 16 BRPM | HEIGHT: 66 IN | HEART RATE: 82 BPM | BODY MASS INDEX: 17.25 KG/M2

## 2025-02-20 DIAGNOSIS — F03.A0 MILD DEMENTIA WITHOUT BEHAVIORAL DISTURBANCE, PSYCHOTIC DISTURBANCE, MOOD DISTURBANCE, OR ANXIETY, UNSPECIFIED DEMENTIA TYPE: ICD-10-CM

## 2025-02-20 DIAGNOSIS — F03.B0 MODERATE DEMENTIA, UNSPECIFIED DEMENTIA TYPE, UNSPECIFIED WHETHER BEHAVIORAL, PSYCHOTIC, OR MOOD DISTURBANCE OR ANXIETY: Primary | ICD-10-CM

## 2025-02-20 PROCEDURE — 99214 OFFICE O/P EST MOD 30 MIN: CPT | Performed by: PSYCHIATRY & NEUROLOGY

## 2025-02-20 PROCEDURE — 99417 PROLNG OP E/M EACH 15 MIN: CPT | Performed by: PSYCHIATRY & NEUROLOGY

## 2025-02-20 RX ORDER — LANOLIN ALCOHOL/MO/W.PET/CERES
1000 CREAM (GRAM) TOPICAL DAILY
Qty: 90 TABLET | Refills: 3 | Status: SHIPPED | OUTPATIENT
Start: 2025-02-20 | End: 2026-02-20

## 2025-02-20 RX ORDER — OMEPRAZOLE 40 MG/1
40 CAPSULE, DELAYED RELEASE ORAL
COMMUNITY
End: 2025-02-26 | Stop reason: WASHOUT

## 2025-02-20 RX ORDER — MULTIVITAMIN
1 TABLET ORAL DAILY
Qty: 90 TABLET | Refills: 3 | Status: SHIPPED | OUTPATIENT
Start: 2025-02-20

## 2025-02-20 ASSESSMENT — PAIN SCALES - GENERAL: PAINLEVEL_OUTOF10: 2

## 2025-02-20 NOTE — PATIENT INSTRUCTIONS
You were seen today by Dr. Ramirez.  It is a pleasure seeing you.    Given the history and today's evaluation,  I suspect moderate dementia without behavioral disturbance  Potential etiologies include Alzheimer's disease, vascular dementia, contributing factors include recent COVID infection, vitamin B12 deficiency and hearing loss, a multifactorial etiology can't be entirely ruled out.    PLAN:  Continue follow-up with PCP regarding recovery from COVID and assessment of GI upset  I recommend adequate hydration, at least 8-12 cups of water and balanced diet.  Will reduce Aricept to 5 mg daily for 4 weeks then stop it if GI upset persists  Continue vitamin B12 supplementation, follow-up with PCP regarding vitamin B12 levels  I agree with the plan of moving to assisted-living facility, this would provide more supervision  I recommended to optimize hearing, use ramiro aids consistently   Follow-up in 6 months or earlier if needed.       Please call [phone] if you have any questions.
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
## 2025-02-25 ENCOUNTER — APPOINTMENT (OUTPATIENT)
Dept: NEUROLOGY | Facility: HOSPITAL | Age: 75
End: 2025-02-25
Payer: COMMERCIAL

## 2025-02-26 ENCOUNTER — APPOINTMENT (OUTPATIENT)
Dept: RADIOLOGY | Facility: HOSPITAL | Age: 75
End: 2025-02-26
Payer: COMMERCIAL

## 2025-02-26 ENCOUNTER — APPOINTMENT (OUTPATIENT)
Dept: CARDIOLOGY | Facility: HOSPITAL | Age: 75
End: 2025-02-26
Payer: COMMERCIAL

## 2025-02-26 ENCOUNTER — HOSPITAL ENCOUNTER (OUTPATIENT)
Facility: HOSPITAL | Age: 75
Setting detail: OBSERVATION
Discharge: HOME | End: 2025-02-27
Attending: EMERGENCY MEDICINE | Admitting: INTERNAL MEDICINE
Payer: COMMERCIAL

## 2025-02-26 DIAGNOSIS — E87.6 HYPOKALEMIA: ICD-10-CM

## 2025-02-26 DIAGNOSIS — K57.92 DIVERTICULITIS: Primary | ICD-10-CM

## 2025-02-26 DIAGNOSIS — N17.9 AKI (ACUTE KIDNEY INJURY) (CMS-HCC): ICD-10-CM

## 2025-02-26 DIAGNOSIS — K59.00 CONSTIPATION, UNSPECIFIED CONSTIPATION TYPE: ICD-10-CM

## 2025-02-26 DIAGNOSIS — R62.7 FAILURE TO THRIVE IN ADULT: ICD-10-CM

## 2025-02-26 PROBLEM — F41.9 ANXIETY AND DEPRESSION: Chronic | Status: ACTIVE | Noted: 2021-01-24

## 2025-02-26 PROBLEM — R63.4 UNINTENTIONAL WEIGHT LOSS: Status: ACTIVE | Noted: 2025-02-26

## 2025-02-26 PROBLEM — F03.90 DEMENTIA: Chronic | Status: ACTIVE | Noted: 2024-02-20

## 2025-02-26 PROBLEM — F32.A ANXIETY AND DEPRESSION: Chronic | Status: ACTIVE | Noted: 2021-01-24

## 2025-02-26 PROBLEM — I10 HYPERTENSION: Chronic | Status: ACTIVE | Noted: 2024-03-23

## 2025-02-26 PROBLEM — F03.90 DEMENTIA: Status: ACTIVE | Noted: 2024-02-20

## 2025-02-26 LAB
ALBUMIN SERPL BCP-MCNC: 3.8 G/DL (ref 3.4–5)
ALP SERPL-CCNC: 50 U/L (ref 33–136)
ALT SERPL W P-5'-P-CCNC: 10 U/L (ref 7–45)
ANION GAP SERPL CALC-SCNC: 12 MMOL/L (ref 10–20)
APPEARANCE UR: CLEAR
AST SERPL W P-5'-P-CCNC: 14 U/L (ref 9–39)
BASOPHILS # BLD AUTO: 0.02 X10*3/UL (ref 0–0.1)
BASOPHILS NFR BLD AUTO: 0.4 %
BILIRUB SERPL-MCNC: 0.4 MG/DL (ref 0–1.2)
BILIRUB UR STRIP.AUTO-MCNC: NEGATIVE MG/DL
BUN SERPL-MCNC: 30 MG/DL (ref 6–23)
CALCIUM SERPL-MCNC: 9 MG/DL (ref 8.6–10.3)
CARDIAC TROPONIN I PNL SERPL HS: 3 NG/L (ref 0–13)
CARDIAC TROPONIN I PNL SERPL HS: 4 NG/L (ref 0–13)
CHLORIDE SERPL-SCNC: 101 MMOL/L (ref 98–107)
CO2 SERPL-SCNC: 29 MMOL/L (ref 21–32)
COLOR UR: ABNORMAL
CREAT SERPL-MCNC: 1.58 MG/DL (ref 0.5–1.05)
EGFRCR SERPLBLD CKD-EPI 2021: 34 ML/MIN/1.73M*2
EOSINOPHIL # BLD AUTO: 0.06 X10*3/UL (ref 0–0.4)
EOSINOPHIL NFR BLD AUTO: 1.3 %
ERYTHROCYTE [DISTWIDTH] IN BLOOD BY AUTOMATED COUNT: 11.7 % (ref 11.5–14.5)
GLUCOSE SERPL-MCNC: 98 MG/DL (ref 74–99)
GLUCOSE UR STRIP.AUTO-MCNC: NORMAL MG/DL
HCT VFR BLD AUTO: 33.8 % (ref 36–46)
HGB BLD-MCNC: 10.9 G/DL (ref 12–16)
HGB RETIC QN: 34 PG (ref 28–38)
IMM GRANULOCYTES # BLD AUTO: 0.01 X10*3/UL (ref 0–0.5)
IMM GRANULOCYTES NFR BLD AUTO: 0.2 % (ref 0–0.9)
IMMATURE RETIC FRACTION: 4.9 %
INR PPP: 1.1 (ref 0.9–1.1)
KETONES UR STRIP.AUTO-MCNC: NEGATIVE MG/DL
LACTATE SERPL-SCNC: 1.4 MMOL/L (ref 0.4–2)
LEUKOCYTE ESTERASE UR QL STRIP.AUTO: NEGATIVE
LIPASE SERPL-CCNC: 44 U/L (ref 9–82)
LYMPHOCYTES # BLD AUTO: 0.75 X10*3/UL (ref 0.8–3)
LYMPHOCYTES NFR BLD AUTO: 16.7 %
MAGNESIUM SERPL-MCNC: 1.86 MG/DL (ref 1.6–2.4)
MCH RBC QN AUTO: 30.4 PG (ref 26–34)
MCHC RBC AUTO-ENTMCNC: 32.2 G/DL (ref 32–36)
MCV RBC AUTO: 94 FL (ref 80–100)
MONOCYTES # BLD AUTO: 0.34 X10*3/UL (ref 0.05–0.8)
MONOCYTES NFR BLD AUTO: 7.6 %
MUCOUS THREADS #/AREA URNS AUTO: ABNORMAL /LPF
NEUTROPHILS # BLD AUTO: 3.31 X10*3/UL (ref 1.6–5.5)
NEUTROPHILS NFR BLD AUTO: 73.8 %
NITRITE UR QL STRIP.AUTO: NEGATIVE
NRBC BLD-RTO: 0 /100 WBCS (ref 0–0)
PH UR STRIP.AUTO: 6.5 [PH]
PLATELET # BLD AUTO: 139 X10*3/UL (ref 150–450)
POTASSIUM SERPL-SCNC: 3.1 MMOL/L (ref 3.5–5.3)
PROT SERPL-MCNC: 6.4 G/DL (ref 6.4–8.2)
PROT UR STRIP.AUTO-MCNC: ABNORMAL MG/DL
PROTHROMBIN TIME: 12.2 SECONDS (ref 9.8–12.4)
RBC # BLD AUTO: 3.58 X10*6/UL (ref 4–5.2)
RBC # UR STRIP.AUTO: NEGATIVE MG/DL
RBC #/AREA URNS AUTO: ABNORMAL /HPF
RETICS #: 0.02 X10*6/UL (ref 0.02–0.11)
RETICS/RBC NFR AUTO: 0.6 % (ref 0.5–2)
SODIUM SERPL-SCNC: 139 MMOL/L (ref 136–145)
SP GR UR STRIP.AUTO: >1.05
UROBILINOGEN UR STRIP.AUTO-MCNC: NORMAL MG/DL
WBC # BLD AUTO: 4.5 X10*3/UL (ref 4.4–11.3)
WBC #/AREA URNS AUTO: ABNORMAL /HPF

## 2025-02-26 PROCEDURE — 99285 EMERGENCY DEPT VISIT HI MDM: CPT | Mod: 25 | Performed by: EMERGENCY MEDICINE

## 2025-02-26 PROCEDURE — 71045 X-RAY EXAM CHEST 1 VIEW: CPT | Performed by: RADIOLOGY

## 2025-02-26 PROCEDURE — 96367 TX/PROPH/DG ADDL SEQ IV INF: CPT | Mod: 59

## 2025-02-26 PROCEDURE — 85610 PROTHROMBIN TIME: CPT | Performed by: EMERGENCY MEDICINE

## 2025-02-26 PROCEDURE — 2500000004 HC RX 250 GENERAL PHARMACY W/ HCPCS (ALT 636 FOR OP/ED): Performed by: EMERGENCY MEDICINE

## 2025-02-26 PROCEDURE — 93005 ELECTROCARDIOGRAM TRACING: CPT

## 2025-02-26 PROCEDURE — 83690 ASSAY OF LIPASE: CPT | Performed by: EMERGENCY MEDICINE

## 2025-02-26 PROCEDURE — 36415 COLL VENOUS BLD VENIPUNCTURE: CPT | Performed by: EMERGENCY MEDICINE

## 2025-02-26 PROCEDURE — 99285 EMERGENCY DEPT VISIT HI MDM: CPT | Performed by: EMERGENCY MEDICINE

## 2025-02-26 PROCEDURE — 93010 ELECTROCARDIOGRAM REPORT: CPT | Performed by: INTERNAL MEDICINE

## 2025-02-26 PROCEDURE — 81001 URINALYSIS AUTO W/SCOPE: CPT | Performed by: EMERGENCY MEDICINE

## 2025-02-26 PROCEDURE — G0378 HOSPITAL OBSERVATION PER HR: HCPCS

## 2025-02-26 PROCEDURE — 71045 X-RAY EXAM CHEST 1 VIEW: CPT

## 2025-02-26 PROCEDURE — 84484 ASSAY OF TROPONIN QUANT: CPT | Performed by: EMERGENCY MEDICINE

## 2025-02-26 PROCEDURE — 2500000004 HC RX 250 GENERAL PHARMACY W/ HCPCS (ALT 636 FOR OP/ED): Performed by: NURSE PRACTITIONER

## 2025-02-26 PROCEDURE — 80053 COMPREHEN METABOLIC PANEL: CPT | Performed by: EMERGENCY MEDICINE

## 2025-02-26 PROCEDURE — 2550000001 HC RX 255 CONTRASTS: Performed by: EMERGENCY MEDICINE

## 2025-02-26 PROCEDURE — 85045 AUTOMATED RETICULOCYTE COUNT: CPT | Performed by: EMERGENCY MEDICINE

## 2025-02-26 PROCEDURE — 74177 CT ABD & PELVIS W/CONTRAST: CPT

## 2025-02-26 PROCEDURE — 2500000001 HC RX 250 WO HCPCS SELF ADMINISTERED DRUGS (ALT 637 FOR MEDICARE OP): Performed by: EMERGENCY MEDICINE

## 2025-02-26 PROCEDURE — 83735 ASSAY OF MAGNESIUM: CPT | Performed by: EMERGENCY MEDICINE

## 2025-02-26 PROCEDURE — 74177 CT ABD & PELVIS W/CONTRAST: CPT | Performed by: RADIOLOGY

## 2025-02-26 PROCEDURE — 96361 HYDRATE IV INFUSION ADD-ON: CPT | Mod: 59

## 2025-02-26 PROCEDURE — 2500000001 HC RX 250 WO HCPCS SELF ADMINISTERED DRUGS (ALT 637 FOR MEDICARE OP): Performed by: NURSE PRACTITIONER

## 2025-02-26 PROCEDURE — 83605 ASSAY OF LACTIC ACID: CPT | Performed by: EMERGENCY MEDICINE

## 2025-02-26 PROCEDURE — 85025 COMPLETE CBC W/AUTO DIFF WBC: CPT | Performed by: EMERGENCY MEDICINE

## 2025-02-26 PROCEDURE — 96365 THER/PROPH/DIAG IV INF INIT: CPT | Mod: 59

## 2025-02-26 RX ORDER — PANTOPRAZOLE SODIUM 40 MG/1
40 TABLET, DELAYED RELEASE ORAL
Status: DISCONTINUED | OUTPATIENT
Start: 2025-02-27 | End: 2025-02-27 | Stop reason: HOSPADM

## 2025-02-26 RX ORDER — LISINOPRIL 2.5 MG/1
2.5 TABLET ORAL DAILY
Status: DISCONTINUED | OUTPATIENT
Start: 2025-02-26 | End: 2025-02-27 | Stop reason: HOSPADM

## 2025-02-26 RX ORDER — ONDANSETRON HYDROCHLORIDE 2 MG/ML
4 INJECTION, SOLUTION INTRAVENOUS EVERY 6 HOURS PRN
Status: DISCONTINUED | OUTPATIENT
Start: 2025-02-26 | End: 2025-02-27 | Stop reason: HOSPADM

## 2025-02-26 RX ORDER — SODIUM CHLORIDE, SODIUM LACTATE, POTASSIUM CHLORIDE, CALCIUM CHLORIDE 600; 310; 30; 20 MG/100ML; MG/100ML; MG/100ML; MG/100ML
75 INJECTION, SOLUTION INTRAVENOUS CONTINUOUS
Status: DISCONTINUED | OUTPATIENT
Start: 2025-02-26 | End: 2025-02-27 | Stop reason: HOSPADM

## 2025-02-26 RX ORDER — FAMOTIDINE 20 MG/1
20 TABLET, FILM COATED ORAL DAILY
COMMUNITY

## 2025-02-26 RX ORDER — METRONIDAZOLE 500 MG/100ML
500 INJECTION, SOLUTION INTRAVENOUS ONCE
Status: DISCONTINUED | OUTPATIENT
Start: 2025-02-26 | End: 2025-02-27

## 2025-02-26 RX ORDER — TALC
3 POWDER (GRAM) TOPICAL NIGHTLY PRN
Status: DISCONTINUED | OUTPATIENT
Start: 2025-02-26 | End: 2025-02-27 | Stop reason: HOSPADM

## 2025-02-26 RX ORDER — ONDANSETRON 4 MG/1
4 TABLET, FILM COATED ORAL EVERY 6 HOURS PRN
Status: DISCONTINUED | OUTPATIENT
Start: 2025-02-26 | End: 2025-02-27 | Stop reason: HOSPADM

## 2025-02-26 RX ORDER — DONEPEZIL HYDROCHLORIDE 5 MG/1
5 TABLET, FILM COATED ORAL NIGHTLY
Status: DISCONTINUED | OUTPATIENT
Start: 2025-02-27 | End: 2025-02-27 | Stop reason: HOSPADM

## 2025-02-26 RX ORDER — SERTRALINE HYDROCHLORIDE 100 MG/1
100 TABLET, FILM COATED ORAL DAILY
Status: DISCONTINUED | OUTPATIENT
Start: 2025-02-27 | End: 2025-02-27 | Stop reason: HOSPADM

## 2025-02-26 RX ORDER — BUPROPION HYDROCHLORIDE 75 MG/1
75 TABLET ORAL 2 TIMES DAILY
Status: DISCONTINUED | OUTPATIENT
Start: 2025-02-26 | End: 2025-02-27 | Stop reason: HOSPADM

## 2025-02-26 RX ORDER — POTASSIUM CHLORIDE 1.5 G/1.58G
60 POWDER, FOR SOLUTION ORAL ONCE
Status: COMPLETED | OUTPATIENT
Start: 2025-02-26 | End: 2025-02-26

## 2025-02-26 RX ORDER — METRONIDAZOLE 500 MG/100ML
500 INJECTION, SOLUTION INTRAVENOUS EVERY 8 HOURS
Status: DISCONTINUED | OUTPATIENT
Start: 2025-02-26 | End: 2025-02-27

## 2025-02-26 RX ORDER — ONDANSETRON 4 MG/1
4 TABLET, FILM COATED ORAL DAILY PRN
COMMUNITY

## 2025-02-26 RX ORDER — LANOLIN ALCOHOL/MO/W.PET/CERES
1000 CREAM (GRAM) TOPICAL DAILY
Status: DISCONTINUED | OUTPATIENT
Start: 2025-02-27 | End: 2025-02-27 | Stop reason: HOSPADM

## 2025-02-26 RX ORDER — SIMVASTATIN 40 MG/1
40 TABLET, FILM COATED ORAL NIGHTLY
Status: DISCONTINUED | OUTPATIENT
Start: 2025-02-27 | End: 2025-02-27 | Stop reason: HOSPADM

## 2025-02-26 RX ORDER — FAMOTIDINE 20 MG/1
20 TABLET, FILM COATED ORAL NIGHTLY
Status: DISCONTINUED | OUTPATIENT
Start: 2025-02-26 | End: 2025-02-27 | Stop reason: HOSPADM

## 2025-02-26 RX ORDER — PANTOPRAZOLE SODIUM 40 MG/1
40 TABLET, DELAYED RELEASE ORAL
COMMUNITY

## 2025-02-26 RX ORDER — AMOXICILLIN 250 MG
2 CAPSULE ORAL 2 TIMES DAILY
Status: DISCONTINUED | OUTPATIENT
Start: 2025-02-26 | End: 2025-02-27 | Stop reason: HOSPADM

## 2025-02-26 RX ORDER — ACETAMINOPHEN 325 MG/1
650 TABLET ORAL EVERY 4 HOURS PRN
Status: DISCONTINUED | OUTPATIENT
Start: 2025-02-26 | End: 2025-02-27 | Stop reason: HOSPADM

## 2025-02-26 RX ORDER — POLYETHYLENE GLYCOL 3350 17 G/17G
17 POWDER, FOR SOLUTION ORAL DAILY
Status: DISCONTINUED | OUTPATIENT
Start: 2025-02-26 | End: 2025-02-27

## 2025-02-26 RX ADMIN — BUPROPION HYDROCHLORIDE 75 MG: 75 TABLET, FILM COATED ORAL at 22:35

## 2025-02-26 RX ADMIN — CEFTRIAXONE 2 G: 2 INJECTION, POWDER, FOR SOLUTION INTRAMUSCULAR; INTRAVENOUS at 17:10

## 2025-02-26 RX ADMIN — SODIUM CHLORIDE, POTASSIUM CHLORIDE, SODIUM LACTATE AND CALCIUM CHLORIDE 1000 ML: 600; 310; 30; 20 INJECTION, SOLUTION INTRAVENOUS at 14:43

## 2025-02-26 RX ADMIN — IOHEXOL 65 ML: 350 INJECTION, SOLUTION INTRAVENOUS at 14:53

## 2025-02-26 RX ADMIN — SODIUM CHLORIDE, POTASSIUM CHLORIDE, SODIUM LACTATE AND CALCIUM CHLORIDE 75 ML/HR: 600; 310; 30; 20 INJECTION, SOLUTION INTRAVENOUS at 18:58

## 2025-02-26 RX ADMIN — FAMOTIDINE 20 MG: 20 TABLET, FILM COATED ORAL at 22:35

## 2025-02-26 RX ADMIN — POTASSIUM CHLORIDE 60 MEQ: 1.5 POWDER, FOR SOLUTION ORAL at 16:06

## 2025-02-26 RX ADMIN — METRONIDAZOLE 500 MG: 5 INJECTION, SOLUTION INTRAVENOUS at 18:06

## 2025-02-26 SDOH — SOCIAL STABILITY: SOCIAL INSECURITY
WITHIN THE LAST YEAR, HAVE YOU BEEN KICKED, HIT, SLAPPED, OR OTHERWISE PHYSICALLY HURT BY YOUR PARTNER OR EX-PARTNER?: PATIENT DECLINED

## 2025-02-26 SDOH — ECONOMIC STABILITY: INCOME INSECURITY
IN THE PAST 12 MONTHS HAS THE ELECTRIC, GAS, OIL, OR WATER COMPANY THREATENED TO SHUT OFF SERVICES IN YOUR HOME?: PATIENT DECLINED

## 2025-02-26 SDOH — ECONOMIC STABILITY: FOOD INSECURITY: WITHIN THE PAST 12 MONTHS, THE FOOD YOU BOUGHT JUST DIDN'T LAST AND YOU DIDN'T HAVE MONEY TO GET MORE.: PATIENT DECLINED

## 2025-02-26 SDOH — SOCIAL STABILITY: SOCIAL INSECURITY: HAS ANYONE EVER THREATENED TO HURT YOUR FAMILY OR YOUR PETS?: NO

## 2025-02-26 SDOH — SOCIAL STABILITY: SOCIAL INSECURITY
WITHIN THE LAST YEAR, HAVE YOU BEEN RAPED OR FORCED TO HAVE ANY KIND OF SEXUAL ACTIVITY BY YOUR PARTNER OR EX-PARTNER?: PATIENT DECLINED

## 2025-02-26 SDOH — SOCIAL STABILITY: SOCIAL INSECURITY: ARE YOU OR HAVE YOU BEEN THREATENED OR ABUSED PHYSICALLY, EMOTIONALLY, OR SEXUALLY BY ANYONE?: NO

## 2025-02-26 SDOH — SOCIAL STABILITY: SOCIAL INSECURITY: DO YOU FEEL ANYONE HAS EXPLOITED OR TAKEN ADVANTAGE OF YOU FINANCIALLY OR OF YOUR PERSONAL PROPERTY?: NO

## 2025-02-26 SDOH — SOCIAL STABILITY: SOCIAL INSECURITY: ARE THERE ANY APPARENT SIGNS OF INJURIES/BEHAVIORS THAT COULD BE RELATED TO ABUSE/NEGLECT?: NO

## 2025-02-26 SDOH — SOCIAL STABILITY: SOCIAL INSECURITY: ABUSE: ADULT

## 2025-02-26 SDOH — SOCIAL STABILITY: SOCIAL INSECURITY: WERE YOU ABLE TO COMPLETE ALL THE BEHAVIORAL HEALTH SCREENINGS?: YES

## 2025-02-26 SDOH — SOCIAL STABILITY: SOCIAL INSECURITY: WITHIN THE LAST YEAR, HAVE YOU BEEN AFRAID OF YOUR PARTNER OR EX-PARTNER?: PATIENT DECLINED

## 2025-02-26 SDOH — ECONOMIC STABILITY: FOOD INSECURITY
WITHIN THE PAST 12 MONTHS, YOU WORRIED THAT YOUR FOOD WOULD RUN OUT BEFORE YOU GOT THE MONEY TO BUY MORE.: PATIENT DECLINED

## 2025-02-26 SDOH — SOCIAL STABILITY: SOCIAL INSECURITY: DOES ANYONE TRY TO KEEP YOU FROM HAVING/CONTACTING OTHER FRIENDS OR DOING THINGS OUTSIDE YOUR HOME?: NO

## 2025-02-26 SDOH — SOCIAL STABILITY: SOCIAL INSECURITY
WITHIN THE LAST YEAR, HAVE YOU BEEN HUMILIATED OR EMOTIONALLY ABUSED IN OTHER WAYS BY YOUR PARTNER OR EX-PARTNER?: PATIENT DECLINED

## 2025-02-26 SDOH — SOCIAL STABILITY: SOCIAL INSECURITY: DO YOU FEEL UNSAFE GOING BACK TO THE PLACE WHERE YOU ARE LIVING?: NO

## 2025-02-26 SDOH — SOCIAL STABILITY: SOCIAL INSECURITY: HAVE YOU HAD THOUGHTS OF HARMING ANYONE ELSE?: NO

## 2025-02-26 ASSESSMENT — ACTIVITIES OF DAILY LIVING (ADL)
PATIENT'S MEMORY ADEQUATE TO SAFELY COMPLETE DAILY ACTIVITIES?: UNABLE TO ASSESS
JUDGMENT_ADEQUATE_SAFELY_COMPLETE_DAILY_ACTIVITIES: YES
WALKS IN HOME: INDEPENDENT
DRESSING YOURSELF: INDEPENDENT
ADEQUATE_TO_COMPLETE_ADL: YES
HEARING - LEFT EAR: HEARING AID
FEEDING YOURSELF: INDEPENDENT
BATHING: INDEPENDENT
TOILETING: INDEPENDENT
LACK_OF_TRANSPORTATION: NO
HEARING - RIGHT EAR: HEARING AID
GROOMING: INDEPENDENT

## 2025-02-26 ASSESSMENT — PAIN DESCRIPTION - LOCATION: LOCATION: ABDOMEN

## 2025-02-26 ASSESSMENT — COGNITIVE AND FUNCTIONAL STATUS - GENERAL
PATIENT BASELINE BEDBOUND: NO
DAILY ACTIVITIY SCORE: 24
MOBILITY SCORE: 24

## 2025-02-26 ASSESSMENT — LIFESTYLE VARIABLES
SKIP TO QUESTIONS 9-10: 1
AUDIT-C TOTAL SCORE: 0
AUDIT-C TOTAL SCORE: 0
HOW OFTEN DO YOU HAVE 6 OR MORE DRINKS ON ONE OCCASION: NEVER
HOW MANY STANDARD DRINKS CONTAINING ALCOHOL DO YOU HAVE ON A TYPICAL DAY: PATIENT DOES NOT DRINK
HOW OFTEN DO YOU HAVE A DRINK CONTAINING ALCOHOL: NEVER

## 2025-02-26 ASSESSMENT — ENCOUNTER SYMPTOMS
CONSTIPATION: 1
LIGHT-HEADEDNESS: 1
BLOOD IN STOOL: 0
ABDOMINAL DISTENTION: 0
UNEXPECTED WEIGHT CHANGE: 1
MUSCULOSKELETAL NEGATIVE: 1
DIZZINESS: 1
RESPIRATORY NEGATIVE: 1
ABDOMINAL PAIN: 1
VOMITING: 0
APPETITE CHANGE: 1
CARDIOVASCULAR NEGATIVE: 1
RECTAL PAIN: 0
DIARRHEA: 0
NAUSEA: 1
EYES NEGATIVE: 1

## 2025-02-26 ASSESSMENT — PATIENT HEALTH QUESTIONNAIRE - PHQ9
2. FEELING DOWN, DEPRESSED OR HOPELESS: NOT AT ALL
SUM OF ALL RESPONSES TO PHQ9 QUESTIONS 1 & 2: 0
1. LITTLE INTEREST OR PLEASURE IN DOING THINGS: NOT AT ALL

## 2025-02-26 ASSESSMENT — PAIN - FUNCTIONAL ASSESSMENT: PAIN_FUNCTIONAL_ASSESSMENT: 0-10

## 2025-02-26 ASSESSMENT — PAIN SCALES - GENERAL
PAINLEVEL_OUTOF10: 6
PAINLEVEL_OUTOF10: 2
PAINLEVEL_OUTOF10: 2
PAINLEVEL_OUTOF10: 0 - NO PAIN

## 2025-02-26 NOTE — ASSESSMENT & PLAN NOTE
-Started on ceftriaxone and metronidazole in the emergency department, continue  -Clear liquid diet   -GI consult  -Tova-Colace and MiraLAX for constipation seen on CT

## 2025-02-26 NOTE — ASSESSMENT & PLAN NOTE
Weight 9/9/24 135 lb  Weight 2/3/25 114 lb  Weight 2/26/25 107 lb  28 lb weight loss last 5 months

## 2025-02-26 NOTE — H&P
History Of Present Illness  Diomedes Taylor is a 74 y.o. female with medical history of dementia, hypertension, CAD, RBBB, anxiety and depression presented to [facility] on 2/26/2025 from [place] [facility] office with complaint of hypotension, persistent nausea without vomiting, intermittent abdominal discomfort, weight loss (28 lb last 5 months from  Office weights) and difficulty with food intake (taking 2 hours to eat a meal).  Patient's brother at bedside provides most of the information due to patient's history of dementia.  Patient was diagnosed with COVID on 2/5/2025 and patient's abdominal symptoms started approximately 1 week prior.  Patient denies recent fever/chills, cough, cold symptoms, chest pain, palpitations, shortness of breath, vomiting, diarrhea, melena, hematochezia.  Mr. Taylor at bedside does report she has had some lightheadedness/dizziness and her blood pressure was 80/40 at the GI office today. BP improved with IVF in ED. Per chart review, patient had similar symptoms with nausea and was seen by [facility] 7/2022 and had EGD/colonoscopy with biopsy taken 7/10/2022 that showed chronic inactive gastritis and focal parietal cell hypertrophy-negative for dysplasia, torturous colon, internal hemorrhoids.    Past Medical History  Past Medical History:   Diagnosis Date    Anxiety and depression 01/24/2021    Coronary artery disease involving native coronary artery of native heart without angina pectoris 02/17/2021    Dementia     GERD (gastroesophageal reflux disease)     HTN (hypertension) 03/23/2024    Hyperlipidemia     Major depression, recurrent, chronic (CMS-HCC) 10/10/2023    RBBB        Surgical History  Past Surgical History:   Procedure Laterality Date    COLONOSCOPY W/ POLYPECTOMY      ESOPHAGOGASTRODUODENOSCOPY      HYSTERECTOMY          Social History  Social History     Tobacco Use    Smoking status: Never    Smokeless tobacco: Never   Vaping Use    Vaping  status: Never Used   Substance Use Topics    Alcohol use: Not Currently     Comment: occassionally    Drug use: Never        Family History  Family History   Problem Relation Name Age of Onset    Dementia Mother      Coronary artery disease Mother      Dementia Father      Diverticulitis Father          Allergies  Patient has no known allergies.    Review of Systems   Constitutional:  Positive for appetite change and unexpected weight change (weight loss).   HENT: Negative.     Eyes: Negative.    Respiratory: Negative.     Cardiovascular: Negative.    Gastrointestinal:  Positive for abdominal pain, constipation and nausea. Negative for abdominal distention, blood in stool, diarrhea, rectal pain and vomiting.   Genitourinary: Negative.    Musculoskeletal: Negative.    Skin: Negative.    Neurological:  Positive for dizziness and light-headedness.       Physical Exam  Vitals reviewed.   Constitutional:       General: She is not in acute distress.     Appearance: Normal appearance. She is normal weight. She is not ill-appearing or toxic-appearing.   HENT:      Head: Normocephalic and atraumatic.      Mouth/Throat:      Mouth: Mucous membranes are moist.      Pharynx: Oropharynx is clear.   Eyes:      General: No scleral icterus.     Extraocular Movements: Extraocular movements intact.      Conjunctiva/sclera: Conjunctivae normal.      Pupils: Pupils are equal, round, and reactive to light.   Cardiovascular:      Rate and Rhythm: Normal rate and regular rhythm.      Pulses: Normal pulses.      Heart sounds: Normal heart sounds. No murmur heard.  Pulmonary:      Effort: Pulmonary effort is normal. No respiratory distress.      Breath sounds: Normal breath sounds.   Abdominal:      General: Abdomen is flat. Bowel sounds are normal. There is no distension.      Palpations: Abdomen is soft.      Tenderness: There is abdominal tenderness (lower abdomen). There is no guarding or rebound.   Musculoskeletal:         General: No  "swelling or deformity. Normal range of motion.      Cervical back: Normal range of motion and neck supple.   Skin:     General: Skin is warm and dry.      Capillary Refill: Capillary refill takes less than 2 seconds.      Findings: No erythema or rash.   Neurological:      General: No focal deficit present.      Mental Status: She is alert. Mental status is at baseline.      Sensory: No sensory deficit.   Psychiatric:         Mood and Affect: Mood normal.         Behavior: Behavior normal.         Last Recorded Vitals  Visit Vitals  /73 (BP Location: Right arm, Patient Position: Sitting)   Pulse 62   Temp 37 °C (98.6 °F) (Tympanic)   Resp 18   Ht 1.676 m (5' 6\")   Wt 48.5 kg (107 lb)   SpO2 99%   BMI 17.27 kg/m²   OB Status Menopausal   Smoking Status Never   BSA 1.5 m²        Relevant Results  Results for orders placed or performed during the hospital encounter of 02/26/25 (from the past 24 hours)   CBC and Auto Differential   Result Value Ref Range    WBC 4.5 4.4 - 11.3 x10*3/uL    nRBC 0.0 0.0 - 0.0 /100 WBCs    RBC 3.58 (L) 4.00 - 5.20 x10*6/uL    Hemoglobin 10.9 (L) 12.0 - 16.0 g/dL    Hematocrit 33.8 (L) 36.0 - 46.0 %    MCV 94 80 - 100 fL    MCH 30.4 26.0 - 34.0 pg    MCHC 32.2 32.0 - 36.0 g/dL    RDW 11.7 11.5 - 14.5 %    Platelets 139 (L) 150 - 450 x10*3/uL    Neutrophils % 73.8 40.0 - 80.0 %    Immature Granulocytes %, Automated 0.2 0.0 - 0.9 %    Lymphocytes % 16.7 13.0 - 44.0 %    Monocytes % 7.6 2.0 - 10.0 %    Eosinophils % 1.3 0.0 - 6.0 %    Basophils % 0.4 0.0 - 2.0 %    Neutrophils Absolute 3.31 1.60 - 5.50 x10*3/uL    Immature Granulocytes Absolute, Automated 0.01 0.00 - 0.50 x10*3/uL    Lymphocytes Absolute 0.75 (L) 0.80 - 3.00 x10*3/uL    Monocytes Absolute 0.34 0.05 - 0.80 x10*3/uL    Eosinophils Absolute 0.06 0.00 - 0.40 x10*3/uL    Basophils Absolute 0.02 0.00 - 0.10 x10*3/uL   Comprehensive Metabolic Panel   Result Value Ref Range    Glucose 98 74 - 99 mg/dL    Sodium 139 136 - 145 " mmol/L    Potassium 3.1 (L) 3.5 - 5.3 mmol/L    Chloride 101 98 - 107 mmol/L    Bicarbonate 29 21 - 32 mmol/L    Anion Gap 12 10 - 20 mmol/L    Urea Nitrogen 30 (H) 6 - 23 mg/dL    Creatinine 1.58 (H) 0.50 - 1.05 mg/dL    eGFR 34 (L) >60 mL/min/1.73m*2    Calcium 9.0 8.6 - 10.3 mg/dL    Albumin 3.8 3.4 - 5.0 g/dL    Alkaline Phosphatase 50 33 - 136 U/L    Total Protein 6.4 6.4 - 8.2 g/dL    AST 14 9 - 39 U/L    Bilirubin, Total 0.4 0.0 - 1.2 mg/dL    ALT 10 7 - 45 U/L   Magnesium   Result Value Ref Range    Magnesium 1.86 1.60 - 2.40 mg/dL   Protime-INR   Result Value Ref Range    Protime 12.2 9.8 - 12.4 seconds    INR 1.1 0.9 - 1.1   Lipase   Result Value Ref Range    Lipase 44 9 - 82 U/L   Lactate   Result Value Ref Range    Lactate 1.4 0.4 - 2.0 mmol/L   Troponin I, High Sensitivity, Initial   Result Value Ref Range    Troponin I, High Sensitivity 4 0 - 13 ng/L   Reticulocytes   Result Value Ref Range    Retic % 0.6 0.5 - 2.0 %    Retic Absolute 0.022 0.017 - 0.110 x10*6/uL    Reticulocyte Hemoglobin 34 28 - 38 pg    Immature Retic fraction 4.9 <=16.0 %   Troponin, High Sensitivity, 1 Hour   Result Value Ref Range    Troponin I, High Sensitivity 3 0 - 13 ng/L   Urinalysis with Reflex Culture and Microscopic   Result Value Ref Range    Color, Urine Light-Yellow Light-Yellow, Yellow, Dark-Yellow    Appearance, Urine Clear Clear    Specific Gravity, Urine >1.050 (N) 1.005 - 1.035    pH, Urine 6.5 5.0, 5.5, 6.0, 6.5, 7.0, 7.5, 8.0    Protein, Urine 10 (TRACE) NEGATIVE, 10 (TRACE), 20 (TRACE) mg/dL    Glucose, Urine Normal Normal mg/dL    Blood, Urine NEGATIVE NEGATIVE mg/dL    Ketones, Urine NEGATIVE NEGATIVE mg/dL    Bilirubin, Urine NEGATIVE NEGATIVE mg/dL    Urobilinogen, Urine Normal Normal mg/dL    Nitrite, Urine NEGATIVE NEGATIVE    Leukocyte Esterase, Urine NEGATIVE NEGATIVE   Urinalysis Microscopic   Result Value Ref Range    WBC, Urine 1-5 1-5, NONE /HPF    RBC, Urine 11-20 (A) NONE, 1-2, 3-5 /HPF    Mucus,  Urine FEW Reference range not established. /LPF      Imaging:  CT abdomen pelvis w IV contrast   Final Result   1. Stable 4 mm right lower lobe pulmonary nodule compared to   02/05/2025.   2. Small pericardial effusion, also unchanged compared to the   02/05/2025 study.   3. Questionable changes of diverticulitis at the junction of the   sigmoid colon with the distal descending colon within the left iliac   fossa.   4. Probable constipation.             MACRO:   none        Signed by: Lee Massey 2/26/2025 3:28 PM   Dictation workstation:   [id number]      XR chest 1 view   Final Result   No active disease in the chest identified.        MACRO:   None        Signed by: Devin Schulz 2/26/2025 2:18 PM   Dictation workstation:   [id number]          EKG:  Encounter Date: 02/05/25   ECG 12 lead   Result Value    Ventricular Rate 56    Atrial Rate 56    CA Interval 178    QRS Duration 130    QT Interval 478    QTC Calculation(Bazett) 461    P Axis 64    R Axis 50    T Axis 36    QRS Count 9    Q Onset 222    P Onset 133    P Offset 174    T Offset 461    QTC Fredericia 467    Narrative    Sinus bradycardia  Right bundle branch block  Abnormal ECG  When compared with ECG of 23-MAR-2024 13:54,  No significant change was found  Confirmed by Sharon Ly ([id number]) on 2/22/2025 12:30:54 PM     Echo:  No results found for this or any previous visit.    Home Medications  Prior to Admission medications    Medication Sig Start Date End Date Taking? Authorizing Provider   buPROPion (Wellbutrin) 75 mg tablet Take 1 tablet (75 mg) by mouth 2 times a day.   Yes Historical Provider, MD   cyanocobalamin (Vitamin B-12) 1,000 mcg tablet Take 1 tablet (1,000 mcg) by mouth once daily. 2/20/25 2/20/26 Yes Mathieu Ramirez MD   donepezil (Aricept) 10 mg tablet Take one tab daily 8/20/24  Yes Mathieu Ramirez MD   lisinopril 2.5 mg tablet Take 1 tablet (2.5 mg) by mouth once daily.   Yes Historical Provider, MD   multivitamin tablet  Take 1 tablet by mouth once daily. 2/20/25  Yes Mathieu Ramirez MD   ondansetron (Zofran) 4 mg tablet Take 1 tablet (4 mg) by mouth once daily as needed for nausea or vomiting.   Yes Historical Provider, MD   pantoprazole (ProtoNix) 40 mg EC tablet Take 1 tablet (40 mg) by mouth once daily in the morning. Take before meals. Do not crush, chew, or split.   Yes Historical Provider, MD   sertraline (Zoloft) 100 mg tablet Take 1 tablet (100 mg) by mouth once daily.   Yes Historical Provider, MD   simvastatin (Zocor) 40 mg tablet Take 1 tablet (40 mg) by mouth once daily at bedtime.   Yes Historical Provider, MD   famotidine (Pepcid) 20 mg tablet Take 1 tablet (20 mg) by mouth once daily.    Historical Provider, MD   omeprazole (PriLOSEC) 40 mg DR capsule Take 1 capsule (40 mg) by mouth once daily in the morning. Take before meals.  Patient not taking: Reported on 2/26/2025 2/26/25  Historical Provider, MD       Medications  Scheduled medications  buPROPion, 75 mg, oral, BID  cefTRIAXone, 2 g, intravenous, q24h  [START ON 2/27/2025] cyanocobalamin, 1,000 mcg, oral, Daily  [START ON 2/27/2025] donepezil, 5 mg, oral, Nightly  famotidine, 20 mg, oral, Nightly  [Held by provider] lisinopril, 2.5 mg, oral, Daily  metroNIDAZOLE, 500 mg, intravenous, Once  metroNIDAZOLE, 500 mg, intravenous, q8h  [START ON 2/27/2025] pantoprazole, 40 mg, oral, Daily before breakfast  sennosides-docusate sodium, 2 tablet, oral, BID  [START ON 2/27/2025] sertraline, 100 mg, oral, Daily  [START ON 2/27/2025] simvastatin, 40 mg, oral, Nightly      Continuous medications  lactated Ringer's, 75 mL/hr      PRN medications  acetaminophen, 650 mg, q4h PRN  melatonin, 3 mg, Nightly PRN  ondansetron, 4 mg, q6h PRN   Or  ondansetron, 4 mg, q6h PRN        Assessment/Plan   Assessment & Plan  Diverticulitis  -Started on ceftriaxone and metronidazole in the emergency department, continue  -Clear liquid diet   -GI consult  -Tova-Colace and MiraLAX for  constipation seen on CT  Anxiety and depression  -Continue home bupropion and sertraline  Dementia  -Continue home Aricept  Hypertension  -Hold home lisinopril due to hypotension  Acute kidney injury (CMS-HCC)  -Baseline creatinine 0.7-0.9, creatinine currently 1.58  -IVF  -Monitor BMP  Hypokalemia  -Potassium 3.1-replaced in ED  -Monitor BMP  Unintentional weight loss  Weight 9/9/24 135 lb  Weight 2/3/25 114 lb  Weight 2/26/25 107 lb  28 lb weight loss last 5 months         VTE prophylaxis:   DVT prophylaxis: SCDs      See additional orders for further plan of care.   Further evaluation and management per attending and consulting physicians.      Code Status  DNR-discussed with patient's brother Jackson TaylorGUSTAVO at bedside. [place] DNR form filled out and placed on chart. Mr. Taylor also given a copy    Of note patient is Jehovah witness and her wishes are no blood products.    I spent a total of 55 minutes on the date of the service in the professional and overall care of this patient.which included preparing to see the patient, face-to-face patient care, completing clinical documentation, and obtaining and/or reviewing separately obtained history.     Giana Mccall, [facility]  Pager [phone]

## 2025-02-26 NOTE — ED PROVIDER NOTES
EMERGENCY DEPARTMENT ENCOUNTER      Pt Name: Diomedes Taylor  MRN: [id number]  Birthdate 1950  Date of evaluation: 2/26/2025  Provider: Panchito Pichardo DO    CHIEF COMPLAINT       Chief Complaint   Patient presents with    Hypotension     HISTORY OF PRESENT ILLNESS    Diomedes Taylor is a 74 y.o. year old female who presents to the ER for low blood pressure.  History supplemented by brother.  She has had COVID for 3 weeks, has been shaking, today he took her blood pressure and it was 80/40 by machine, 92/50 by manual cuff.  She has had 3 to 4 weeks of persistent nausea, eructation, 23 pounds of weight loss and dropped from 130 to 107.  Does report epigastric abdominal pain.  Reports that due to her persistent nausea it takes her about 2 hours to eat at a time.  Denies fever, chest pain, shortness of breath, changes to urine or stool.    PMH HTN, anxiety depression, CAD, dementia  PSH hysterectomy  NKDA     PAST MEDICAL HISTORY     Past Medical History:   Diagnosis Date    Anxiety and depression 01/24/2021    Coronary artery disease involving native coronary artery of native heart without angina pectoris 02/17/2021    Dementia     GERD (gastroesophageal reflux disease)     HTN (hypertension) 03/23/2024    Hyperlipidemia     Major depression, recurrent, chronic (CMS-HCC) 10/10/2023    RBBB      CURRENT MEDICATIONS       Current Discharge Medication List        CONTINUE these medications which have NOT CHANGED    Details   buPROPion (Wellbutrin) 75 mg tablet Take 1 tablet (75 mg) by mouth 2 times a day.      cyanocobalamin (Vitamin B-12) 1,000 mcg tablet Take 1 tablet (1,000 mcg) by mouth once daily.  Qty: 90 tablet, Refills: 3    Associated Diagnoses: Mild dementia without behavioral disturbance, psychotic disturbance, mood disturbance, or anxiety, unspecified dementia type      donepezil (Aricept) 10 mg tablet Take one tab daily  Qty: 90 tablet, Refills: 3    Associated Diagnoses: Moderate dementia, unspecified  dementia type, unspecified whether behavioral, psychotic, or mood disturbance or anxiety      lisinopril 2.5 mg tablet Take 1 tablet (2.5 mg) by mouth once daily.      multivitamin tablet Take 1 tablet by mouth once daily.  Qty: 90 tablet, Refills: 3    Associated Diagnoses: Moderate dementia, unspecified dementia type, unspecified whether behavioral, psychotic, or mood disturbance or anxiety      ondansetron (Zofran) 4 mg tablet Take 1 tablet (4 mg) by mouth once daily as needed for nausea or vomiting.      pantoprazole (ProtoNix) 40 mg EC tablet Take 1 tablet (40 mg) by mouth once daily in the morning. Take before meals. Do not crush, chew, or split.      sertraline (Zoloft) 100 mg tablet Take 1 tablet (100 mg) by mouth once daily.      simvastatin (Zocor) 40 mg tablet Take 1 tablet (40 mg) by mouth once daily at bedtime.      famotidine (Pepcid) 20 mg tablet Take 1 tablet (20 mg) by mouth once daily.           SURGICAL HISTORY       Past Surgical History:   Procedure Laterality Date    COLONOSCOPY W/ POLYPECTOMY      ESOPHAGOGASTRODUODENOSCOPY      HYSTERECTOMY       ALLERGIES     Patient has no known allergies.  FAMILY HISTORY       Family History   Problem Relation Name Age of Onset    Dementia Mother      Coronary artery disease Mother      Dementia Father      Diverticulitis Father       SOCIAL HISTORY       Social History     Tobacco Use    Smoking status: Never    Smokeless tobacco: Never   Vaping Use    Vaping status: Never Used   Substance Use Topics    Alcohol use: Not Currently     Comment: occassionally    Drug use: Never     PHYSICAL EXAM  (up to 7 for level 4, 8 or more for level 5)     ED Triage Vitals [02/26/25 1311]   Temperature Heart Rate Respirations BP   37 °C (98.6 °F) 63 18 94/51      Pulse Ox Temp Source Heart Rate Source Patient Position   99 % Temporal -- --      BP Location FiO2 (%)     -- --       Physical Exam  Vitals and nursing note reviewed.   Constitutional:       General: She is  not in acute distress.     Appearance: Normal appearance. She is not ill-appearing.   HENT:      Head: Normocephalic and atraumatic. No raccoon eyes, Escobedo's sign, contusion or laceration.      Jaw: No trismus or malocclusion.      Right Ear: External ear normal.      Left Ear: External ear normal.      Mouth/Throat:      Mouth: Mucous membranes are moist.      Pharynx: Oropharynx is clear.   Eyes:      Extraocular Movements: Extraocular movements intact.      Pupils: Pupils are equal, round, and reactive to light.   Neck:      Trachea: No tracheal deviation.   Cardiovascular:      Rate and Rhythm: Normal rate and regular rhythm.      Pulses: Normal pulses.   Pulmonary:      Effort: No respiratory distress.      Breath sounds: No wheezing, rhonchi or rales.   Chest:      Chest wall: No tenderness.   Abdominal:      General: Abdomen is flat.      Palpations: Abdomen is soft. There is no mass.      Tenderness: There is abdominal tenderness (Epigastric). There is left CVA tenderness. There is no right CVA tenderness.   Musculoskeletal:         General: No tenderness or signs of injury.      Right lower leg: No edema.      Left lower leg: No edema.   Skin:     Coloration: Skin is not jaundiced or pale.      Findings: No petechiae, rash or wound.   Neurological:      Mental Status: She is alert.        DIAGNOSTIC RESULTS   LABS:  Labs Reviewed   CBC WITH AUTO DIFFERENTIAL - Abnormal       Result Value    WBC 4.5      nRBC 0.0      RBC 3.58 (*)     Hemoglobin 10.9 (*)     Hematocrit 33.8 (*)     MCV 94      MCH 30.4      MCHC 32.2      RDW 11.7      Platelets 139 (*)     Neutrophils % 73.8      Immature Granulocytes %, Automated 0.2      Lymphocytes % 16.7      Monocytes % 7.6      Eosinophils % 1.3      Basophils % 0.4      Neutrophils Absolute 3.31      Immature Granulocytes Absolute, Automated 0.01      Lymphocytes Absolute 0.75 (*)     Monocytes Absolute 0.34      Eosinophils Absolute 0.06      Basophils Absolute  0.02     COMPREHENSIVE METABOLIC PANEL - Abnormal    Glucose 98      Sodium 139      Potassium 3.1 (*)     Chloride 101      Bicarbonate 29      Anion Gap 12      Urea Nitrogen 30 (*)     Creatinine 1.58 (*)     eGFR 34 (*)     Calcium 9.0      Albumin 3.8      Alkaline Phosphatase 50      Total Protein 6.4      AST 14      Bilirubin, Total 0.4      ALT 10     URINALYSIS WITH REFLEX CULTURE AND MICROSCOPIC - Abnormal    Color, Urine Light-Yellow      Appearance, Urine Clear      Specific Gravity, Urine >1.050 (*)     pH, Urine 6.5      Protein, Urine 10 (TRACE)      Glucose, Urine Normal      Blood, Urine NEGATIVE      Ketones, Urine NEGATIVE      Bilirubin, Urine NEGATIVE      Urobilinogen, Urine Normal      Nitrite, Urine NEGATIVE      Leukocyte Esterase, Urine NEGATIVE     URINALYSIS MICROSCOPIC WITH REFLEX CULTURE - Abnormal    WBC, Urine 1-5      RBC, Urine 11-20 (*)     Mucus, Urine FEW     MAGNESIUM - Normal    Magnesium 1.86     PROTIME-INR - Normal    Protime 12.2      INR 1.1     LIPASE - Normal    Lipase 44      Narrative:     Venipuncture immediately after or during the administration of Metamizole may lead to falsely low results. Testing should be performed immediately prior to Metamizole dosing.   LACTATE - Normal    Lactate 1.4      Narrative:     Venipuncture immediately after or during the administration of Metamizole may lead to falsely low results. Testing should be performed immediately prior to Metamizole dosing.   SERIAL TROPONIN-INITIAL - Normal    Troponin I, High Sensitivity 4      Narrative:     Less than 99th percentile of normal range cutoff-  Female and children under 18 years old <14 ng/L; Male <21 ng/L: Negative  Repeat testing should be performed if clinically indicated.     Female and children under 18 years old 14-50 ng/L; Male 21-50 ng/L:  Consistent with possible cardiac damage and possible increased clinical   risk. Serial measurements may help to assess extent of myocardial  damage.     >50 ng/L: Consistent with cardiac damage, increased clinical risk and  myocardial infarction. Serial measurements may help assess extent of   myocardial damage.      NOTE: Children less than 1 year old may have higher baseline troponin   levels and results should be interpreted in conjunction with the overall   clinical context.     NOTE: Troponin I testing is performed using a different   testing methodology at [facility] than at other   [facility]. Direct result comparisons should only   be made within the same method.   SERIAL TROPONIN, 1 HOUR - Normal    Troponin I, High Sensitivity 3      Narrative:     Less than 99th percentile of normal range cutoff-  Female and children under 18 years old <14 ng/L; Male <21 ng/L: Negative  Repeat testing should be performed if clinically indicated.     Female and children under 18 years old 14-50 ng/L; Male 21-50 ng/L:  Consistent with possible cardiac damage and possible increased clinical   risk. Serial measurements may help to assess extent of myocardial damage.     >50 ng/L: Consistent with cardiac damage, increased clinical risk and  myocardial infarction. Serial measurements may help assess extent of   myocardial damage.      NOTE: Children less than 1 year old may have higher baseline troponin   levels and results should be interpreted in conjunction with the overall   clinical context.     NOTE: Troponin I testing is performed using a different   testing methodology at [facility] than at other   [facility]. Direct result comparisons should only   be made within the same method.   RETICULOCYTES - Normal    Retic % 0.6      Retic Absolute 0.022      Reticulocyte Hemoglobin 34      Immature Retic fraction 4.9     TROPONIN SERIES- (INITIAL, 1 HR)    Narrative:     The following orders were created for panel order Troponin I Series, High Sensitivity (0, 1 HR).  Procedure                               Abnormality          Status                     ---------                               -----------         ------                     Troponin I, High Sensiti...[[id number]]  Normal              Final result               Troponin, High Sensitivi...[[id number]]  Normal              Final result                 Please view results for these tests on the individual orders.   URINALYSIS WITH REFLEX CULTURE AND MICROSCOPIC    Narrative:     The following orders were created for panel order Urinalysis with Reflex Culture and Microscopic.  Procedure                               Abnormality         Status                     ---------                               -----------         ------                     Urinalysis with Reflex C...[[id number]]  Abnormal            Final result               Extra Urine Gray Tube[[id number]]                            In process                   Please view results for these tests on the individual orders.   EXTRA URINE GRAY TUBE   CBC   BASIC METABOLIC PANEL     All other labs were within normal range or not returned as of this dictation.  Imaging  CT abdomen pelvis w IV contrast   Final Result   1. Stable 4 mm right lower lobe pulmonary nodule compared to   02/05/2025.   2. Small pericardial effusion, also unchanged compared to the   02/05/2025 study.   3. Questionable changes of diverticulitis at the junction of the   sigmoid colon with the distal descending colon within the left iliac   fossa.   4. Probable constipation.             MACRO:   none        Signed by: Lee Massey 2/26/2025 3:28 PM   Dictation workstation:   [id number]      XR chest 1 view   Final Result   No active disease in the chest identified.        MACRO:   None        Signed by: Devin Schulz 2/26/2025 2:18 PM   Dictation workstation:   [id number]         Procedure  Procedures  EMERGENCY DEPARTMENT COURSE/MDM:   Medical Decision Making    Vitals:    Vitals:    02/26/25 1419 02/26/25 1520 02/26/25 1530 02/26/25 1703   BP: (!)  103/45 (!) 109/45 119/62 131/73   BP Location: Left arm Left arm Right arm Right arm   Patient Position: Sitting Lying Sitting Sitting   Pulse: 65 69 63 62   Resp: 18 18  18   Temp:   37 °C (98.6 °F)    TempSrc:   Tympanic    SpO2: 98% 98% 99% 99%   Weight:       Height:         Diomedes Taylor is a female 74 y.o. who presents to the ER for hypotension and significant weight loss. On arrival the patients vital signs were: Afebrile, regular heart rate, normotensive, regular respiration rate, normoxic on room air. History obtained from: patient and brother .  Given hypotension, significant weight loss, epigastric tenderness, left CVA tenderness plan for CBC, CMP, lactate, lipase, UA, troponins, chest x-ray, EKG, CT abdomen pelvis with IV contrast to assess for ACS, malignancy, colitis, enteritis, pancreatitis, cholecystitis, dehydration, electrolyte derangement, etc.  Will provide 1 L LR for rehydration.    ED Course as of 02/26/25 2059 Wed Feb 26, 2025   1522 XR chest 1 view  No active disease in the chest identified. [CB]   1522 Troponin I, High Sensitivity: 4  Not elevated doubt acs or myopericarditis [CB]   1522 LIPASE: 44  Not greater than 3 times upper normal limit doubt pancreatitis [CB]   1522 Lactate: 1.4  Not elevated doubt mesenteric ischemia or occult shock [CB]   1522 Comprehensive Metabolic Panel(!)  hypokalemia, BECKY, otherwise normoglycemic, no acute electrolyte or hepatic abnormality.  Will replete potassium and rehydrate [CB]   1523 CBC and Auto Differential(!)  Normocytic anemia, down nearly 3 points relative to 3 weeks ago.  Slight thrombocytopenia.  No acute leukocytosis leukopenia [CB]   1532 CT abdomen pelvis w IV contrast  1. Stable 4 mm right lower lobe pulmonary nodule compared to  02/05/2025.  2. Small pericardial effusion, also unchanged compared to the  02/05/2025 study.  3. Questionable changes of diverticulitis at the junction of the  sigmoid colon with the distal descending colon within  the left iliac  fossa.  4. Probable constipation. [CB]   1615 Retic %: 0.6  Reticulocyte index 0.3 suggests hypoproliferation rather than acute blood loss anemia [CB]      ED Course User Index  [CB] Panchito Pichardo DO         Diagnoses as of 02/26/25 2059   Diverticulitis   Constipation, unspecified constipation type   BECKY (acute kidney injury) (CMS-[facility])   Hypokalemia   Failure to thrive in adult     External Records Reviewed: I reviewed recent and relevant outside records including inpatient notes, outpatient records  Prescription Drug Consideration: Rocephin Flagyl started for diverticulitis    Shared decision making for disposition  Patient and/or patient´s representative was counseled regarding labs, imaging, likely diagnosis. All questions were answered. Recommendation was made   for Admission given the need for further escalation of care to inpatient management. Patient agreed and was admitted in stable condition. Admitting team was notified of any pending labs or imaging to ensure continuity of care.     ED Medications administered this visit:    Medications   metroNIDAZOLE (Flagyl) 500 mg in sodium chloride (iso)  mL (500 mg intravenous Not Given 2/26/25 1836)   metroNIDAZOLE (Flagyl) 500 mg in sodium chloride (iso)  mL (0 mg intravenous Stopped 2/26/25 1836)   buPROPion (Wellbutrin) tablet 75 mg (has no administration in time range)   cyanocobalamin (Vitamin B-12) tablet 1,000 mcg (has no administration in time range)   donepezil (Aricept) tablet 5 mg (has no administration in time range)   lisinopril tablet 2.5 mg ( oral Dose Auto Held 3/2/25 0900)   famotidine (Pepcid) tablet 20 mg (has no administration in time range)   pantoprazole (ProtoNix) EC tablet 40 mg (has no administration in time range)   sertraline (Zoloft) tablet 100 mg (has no administration in time range)   simvastatin (Zocor) tablet 40 mg (has no administration in time range)   acetaminophen (Tylenol) tablet 650 mg (has no  administration in time range)   melatonin tablet 3 mg (has no administration in time range)   lactated Ringer's infusion (75 mL/hr intravenous New Bag 2/26/25 1858)   ondansetron (Zofran) tablet 4 mg (has no administration in time range)     Or   ondansetron (Zofran) injection 4 mg (has no administration in time range)   sennosides-docusate sodium (Tova-Colace) 8.6-50 mg per tablet 2 tablet (has no administration in time range)   cefTRIAXone (Rocephin) 2 g in sodium chloride 0.9% IV 50 mL (has no administration in time range)   polyethylene glycol (Glycolax, Miralax) packet 17 g (17 g oral Not Given 2/26/25 1948)   lactated Ringer's bolus 1,000 mL (0 mL intravenous Stopped 2/26/25 1603)   iohexol (OMNIPaque) 350 mg iodine/mL solution 65 mL (65 mL intravenous Given 2/26/25 1453)   potassium chloride (Klor-Con) packet 60 mEq (60 mEq oral Given 2/26/25 1606)   cefTRIAXone (Rocephin) 2 g in sodium chloride 0.9% IV 50 mL (0 g intravenous Stopped 2/26/25 1805)          Final Impression:   1. Diverticulitis    2. Constipation, unspecified constipation type    3. BECKY (acute kidney injury) (CMS-HCC)    4. Hypokalemia    5. Failure to thrive in adult          Please excuse any misspellings or unintended errors related to the Dragon speech recognition software used to dictate this note.    I reviewed the case with the attending ED physician. The attending ED physician agrees with the plan.      Panchito Pichardo DO  Resident  02/26/25 2059

## 2025-02-27 VITALS
SYSTOLIC BLOOD PRESSURE: 125 MMHG | HEART RATE: 79 BPM | WEIGHT: 107 LBS | DIASTOLIC BLOOD PRESSURE: 58 MMHG | BODY MASS INDEX: 17.19 KG/M2 | TEMPERATURE: 96.8 F | OXYGEN SATURATION: 98 % | HEIGHT: 66 IN | RESPIRATION RATE: 17 BRPM

## 2025-02-27 LAB
ANION GAP SERPL CALC-SCNC: 9 MMOL/L (ref 10–20)
ATRIAL RATE: 59 BPM
BUN SERPL-MCNC: 21 MG/DL (ref 6–23)
CALCIUM SERPL-MCNC: 8.7 MG/DL (ref 8.6–10.3)
CHLORIDE SERPL-SCNC: 104 MMOL/L (ref 98–107)
CO2 SERPL-SCNC: 32 MMOL/L (ref 21–32)
CREAT SERPL-MCNC: 1.04 MG/DL (ref 0.5–1.05)
EGFRCR SERPLBLD CKD-EPI 2021: 56 ML/MIN/1.73M*2
ERYTHROCYTE [DISTWIDTH] IN BLOOD BY AUTOMATED COUNT: 11.7 % (ref 11.5–14.5)
GLUCOSE SERPL-MCNC: 81 MG/DL (ref 74–99)
HCT VFR BLD AUTO: 32.5 % (ref 36–46)
HGB BLD-MCNC: 10.3 G/DL (ref 12–16)
HOLD SPECIMEN: NORMAL
MCH RBC QN AUTO: 29.9 PG (ref 26–34)
MCHC RBC AUTO-ENTMCNC: 31.7 G/DL (ref 32–36)
MCV RBC AUTO: 95 FL (ref 80–100)
NRBC BLD-RTO: 0 /100 WBCS (ref 0–0)
P AXIS: 54 DEGREES
P OFFSET: 176 MS
P ONSET: 132 MS
PLATELET # BLD AUTO: 119 X10*3/UL (ref 150–450)
POTASSIUM SERPL-SCNC: 3.9 MMOL/L (ref 3.5–5.3)
PR INTERVAL: 180 MS
Q ONSET: 222 MS
QRS COUNT: 10 BEATS
QRS DURATION: 134 MS
QT INTERVAL: 460 MS
QTC CALCULATION(BAZETT): 455 MS
QTC FREDERICIA: 457 MS
R AXIS: 59 DEGREES
RBC # BLD AUTO: 3.44 X10*6/UL (ref 4–5.2)
SODIUM SERPL-SCNC: 141 MMOL/L (ref 136–145)
T AXIS: 30 DEGREES
T OFFSET: 452 MS
VENTRICULAR RATE: 59 BPM
WBC # BLD AUTO: 4 X10*3/UL (ref 4.4–11.3)

## 2025-02-27 PROCEDURE — 2500000001 HC RX 250 WO HCPCS SELF ADMINISTERED DRUGS (ALT 637 FOR MEDICARE OP): Performed by: NURSE PRACTITIONER

## 2025-02-27 PROCEDURE — 36415 COLL VENOUS BLD VENIPUNCTURE: CPT | Performed by: NURSE PRACTITIONER

## 2025-02-27 PROCEDURE — 96366 THER/PROPH/DIAG IV INF ADDON: CPT | Mod: 59

## 2025-02-27 PROCEDURE — 82374 ASSAY BLOOD CARBON DIOXIDE: CPT | Performed by: NURSE PRACTITIONER

## 2025-02-27 PROCEDURE — 2500000004 HC RX 250 GENERAL PHARMACY W/ HCPCS (ALT 636 FOR OP/ED): Performed by: NURSE PRACTITIONER

## 2025-02-27 PROCEDURE — 85027 COMPLETE CBC AUTOMATED: CPT | Performed by: NURSE PRACTITIONER

## 2025-02-27 PROCEDURE — G0378 HOSPITAL OBSERVATION PER HR: HCPCS

## 2025-02-27 PROCEDURE — 2500000002 HC RX 250 W HCPCS SELF ADMINISTERED DRUGS (ALT 637 FOR MEDICARE OP, ALT 636 FOR OP/ED): Performed by: NURSE PRACTITIONER

## 2025-02-27 RX ORDER — METRONIDAZOLE 500 MG/1
500 TABLET ORAL EVERY 8 HOURS SCHEDULED
Status: DISCONTINUED | OUTPATIENT
Start: 2025-02-27 | End: 2025-02-27 | Stop reason: HOSPADM

## 2025-02-27 RX ORDER — AMOXICILLIN AND CLAVULANATE POTASSIUM 875; 125 MG/1; MG/1
1 TABLET, FILM COATED ORAL 2 TIMES DAILY
Qty: 12 TABLET | Refills: 0 | Status: SHIPPED | OUTPATIENT
Start: 2025-02-27 | End: 2025-03-05

## 2025-02-27 RX ORDER — METRONIDAZOLE 500 MG/1
500 TABLET ORAL EVERY 8 HOURS SCHEDULED
Qty: 18 TABLET | Refills: 0 | Status: SHIPPED | OUTPATIENT
Start: 2025-02-27 | End: 2025-03-05

## 2025-02-27 RX ORDER — POLYETHYLENE GLYCOL 3350 17 G/17G
17 POWDER, FOR SOLUTION ORAL 2 TIMES DAILY
Status: DISCONTINUED | OUTPATIENT
Start: 2025-02-27 | End: 2025-02-27 | Stop reason: HOSPADM

## 2025-02-27 RX ORDER — AMOXICILLIN 250 MG
2 CAPSULE ORAL 2 TIMES DAILY
Qty: 120 TABLET | Refills: 0 | Status: SHIPPED | OUTPATIENT
Start: 2025-02-27 | End: 2025-03-29

## 2025-02-27 RX ORDER — POLYETHYLENE GLYCOL 3350 17 G/17G
17 POWDER, FOR SOLUTION ORAL 2 TIMES DAILY
Qty: 60 PACKET | Refills: 0 | Status: SHIPPED | OUTPATIENT
Start: 2025-02-27 | End: 2025-03-29

## 2025-02-27 RX ADMIN — Medication 1000 MCG: at 09:24

## 2025-02-27 RX ADMIN — METRONIDAZOLE 500 MG: 5 INJECTION, SOLUTION INTRAVENOUS at 09:25

## 2025-02-27 RX ADMIN — BUPROPION HYDROCHLORIDE 75 MG: 75 TABLET, FILM COATED ORAL at 09:24

## 2025-02-27 RX ADMIN — CEFTRIAXONE 2 G: 2 INJECTION, POWDER, FOR SOLUTION INTRAMUSCULAR; INTRAVENOUS at 16:00

## 2025-02-27 RX ADMIN — SENNOSIDES AND DOCUSATE SODIUM 2 TABLET: 50; 8.6 TABLET ORAL at 09:24

## 2025-02-27 RX ADMIN — ONDANSETRON HYDROCHLORIDE 4 MG: 4 TABLET, FILM COATED ORAL at 09:35

## 2025-02-27 RX ADMIN — METRONIDAZOLE 500 MG: 5 INJECTION, SOLUTION INTRAVENOUS at 01:09

## 2025-02-27 RX ADMIN — SERTRALINE HYDROCHLORIDE 100 MG: 100 TABLET ORAL at 09:24

## 2025-02-27 RX ADMIN — SODIUM CHLORIDE, POTASSIUM CHLORIDE, SODIUM LACTATE AND CALCIUM CHLORIDE 75 ML/HR: 600; 310; 30; 20 INJECTION, SOLUTION INTRAVENOUS at 09:35

## 2025-02-27 RX ADMIN — METRONIDAZOLE 500 MG: 500 TABLET ORAL at 14:50

## 2025-02-27 RX ADMIN — PANTOPRAZOLE SODIUM 40 MG: 40 TABLET, DELAYED RELEASE ORAL at 06:38

## 2025-02-27 ASSESSMENT — COGNITIVE AND FUNCTIONAL STATUS - GENERAL
MOBILITY SCORE: 24
DAILY ACTIVITIY SCORE: 24

## 2025-02-27 ASSESSMENT — PAIN SCALES - GENERAL: PAINLEVEL_OUTOF10: 0 - NO PAIN

## 2025-02-27 NOTE — PROGRESS NOTES
"Nutrition Initial Assessment:   Nutrition Assessment    Reason for Assessment: Admission nursing screening (MST=3 for \"weight loss without trying and decreased appetite\")    Patient is a 74 y.o. female presenting on 2/26/2025 from [place] gastroenterology office with complaint of hypotension, persistent nausea without vomiting, intermittent abdominal discomfort, weight loss (28 lb last 5 months from Dr. Posey weights) and difficulty with food intake (taking 2 hours to eat a meal).  CT showed \"questionable changes of diverticulitis at the junction of the  sigmoid colon with the distal descending colon within the left iliac  Fossa\" and \"Probable constipation\".  Pt will be scheduled for outpatient EGD Monday with Dr. Can. GI will sign off.     Medical history of dementia, hypertension, CAD, RBBB, anxiety and depression     Nutrition History:  Energy Intake: Poor < 50 %  Pain affecting nutrition status: Yes  If yes, Informed Nursing: Nursing aware.  Food and Nutrient History: Pt lives by herself, but her brother is very involved in her care. Pt explains that she recently had Covid & was surprised to hear this because she does not recall any symptoms. She then developed epigastric pain that she explains caused her to want to vomit everytime she ate anything. She thought it was important that she try to eat even more healthy to prevent the pain and so explains she was trying to eat alot of salads and other raw fruits and vegetables - all of which exacerbated her symptoms. She notes that she likes to eat healhy - 3 meals a day, but generally \"simple\" foods. She was surprised to see the weight come off so quickly with her recent illness. She recalls her father was also diagnosed with diverticulitis. Although she is not upset with the weight loss, she knows further weight loss will be dangerous for her.  Vitamin/Herbal Supplement Use: Home meds include Vt B12, MVI  Food Intolerance:  (Doesn't drink milk.)   " "    Anthropometrics:  Height: 167.6 cm (5' 6\")   Weight: 48.5 kg (107 lb)   BMI (Calculated): 17.28  IBW/kg (Dietitian Calculated): 58.97 kg  Percent of IBW: 82.31 %       Weight History:   Wt Readings from Last 10 Encounters:   02/26/25 48.5 kg (107 lb)   02/20/25 48.7 kg (107 lb 5.8 oz)   02/05/25 51.7 kg (114 lb)   08/20/24 56 kg (123 lb 7.3 oz)   03/23/24 59.4 kg (130 lb 15.3 oz)   02/20/24 59.9 kg (132 lb)   02/08/24 61.2 kg (135 lb)   10/31/23 60.8 kg (134 lb)       Weight Change %:  Weight History / % Weight Change: loss of 7 lbs (6%) in 20 days  Significant Weight Loss: Yes    Nutrition Focused Physical Exam Findings:    Subcutaneous Fat Loss:   Orbital Fat Pads: Mild-Moderate (slight dark circles and slight hollowing)  Buccal Fat Pads: Mild-Moderate (flat cheeks, minimal bounce)  Triceps: Mild-Moderate (less than ample fat tissue)  Ribs: Mild-Moderate (ribs apparent, depressions between them less pronounced, iliac crest somewhat prominent)  Muscle Wasting:  Temporalis: Mild-Moderate (slight depression)  Pectoralis (Clavicular Region): Mild-Moderate (some protrusion of clavicle)  Deltoid/Trapezius: Well nourished (rounded appearance at arm, shoulder, neck)  Interosseous: Mild-Moderate (slightly depressed area between thumb and forefinger)  Trapezius/Infraspinatus/Supraspinatus (Scapular Region): Defer  Quadriceps: Defer  Gastrocnemius: Defer  Edema:  Edema: none  Physical Findings:  Hair: Negative  Skin: Negative  Teeth Findings: Edentulous (Upper dentures at home so she doesn't lose them.)    Nutrition Significant Labs:  CBC Trend:   Results from last 7 days   Lab Units 02/27/25  0505 02/26/25  1434   WBC AUTO x10*3/uL 4.0* 4.5   RBC AUTO x10*6/uL 3.44* 3.58*   HEMOGLOBIN g/dL 10.3* 10.9*   HEMATOCRIT % 32.5* 33.8*   MCV fL 95 94   PLATELETS AUTO x10*3/uL 119* 139*    , BMP Trend:   Results from last 7 days   Lab Units 02/27/25  0505 02/26/25  1434   GLUCOSE mg/dL 81 98   CALCIUM mg/dL 8.7 9.0   SODIUM " "mmol/L 141 139   POTASSIUM mmol/L 3.9 3.1*   CO2 mmol/L 32 29   CHLORIDE mmol/L 104 101   BUN mg/dL 21 30*   CREATININE mg/dL 1.04 1.58*    , Vit D: No results found for: \"VITD25\" , Vit B12:   Lab Results   Component Value Date    [id number] 358 03/23/2024        Nutrition Specific Medications:  Scheduled medications      Continuous medications      PRN medications        I/O:   Last BM Date: 02/26/25 (Pt observed onl;y having a small amt of bowel movement with nurse.); Stool Appearance: Formed (02/26/25 1541)           Estimated Needs:   Total Energy Estimated Needs in 24 hours (kCal):  (5743-4320 kcal (25-30 kcal/kG))     Total Protein Estimated Needs in 24 Hours (g):  (48-58g protein (1.0-1.2g/kg))     Total Fluid Estimated Needs in 24 Hours (mL):  (2792-0982 mL (1mL/kcal))           Nutrition Diagnosis   Malnutrition Diagnosis  Patient has Malnutrition Diagnosis: Yes  Diagnosis Status: New  Malnutrition Diagnosis: Moderate malnutrition related to acute disease or injury  Related to: altered GI function  As Evidenced by: significant weight loss of 7 lb (6%) in 20 days with decreased            Nutrition Interventions/Recommendations   Nutrition prescription for oral nutrition    Nutrition Recommendations:  Individualized Nutrition Prescription Provided for : Contiue REGULAR diet, but encourage low fiber foods for at least the next 2 weeks and gradually add fiber back to the diet. Supplement with ENSURE HP or equivalent (low fiber) oral nutrition supplement at least twice a day to promote weight gain.    Nutrition Interventions/Goals:   Interventions: Meals and snacks, Medical food supplement  Meals and Snacks: Fiber-modified diet  Goal: Limit fiber to less than 8g per day for the next 2 weeks.  Medical Food Supplement: Commercial beverage medical food supplement therapy  Goal: ENSURE HIGH PROTEIN provides 160 kcal/16g protein & low in fiber.      Education Documentation  Nutrition Care Manual, taught by Melania" "WOOD Rios, LD at 2/28/2025  7:47 AM.  Learner: Patient  Readiness: Acceptance  Method: Explanation, Handout  Response: Verbalizes Understanding  Comment: Provided education for DIVERTICULITIS. Handout given: \"LOW FIBER NUTRITION THERAPY\" and \"5 SAMPLE MENUS FOR GRADUALLY ADDING FIBER BACK TO THE DIET\" along with RD contact information and ENSURE coupons.              Nutrition Monitoring and Evaluation   Food/Nutrient Related History Monitoring  Monitoring and Evaluation Plan: Intake / amount of food  Intake / Amount of food: Consumes at least 75% or more of meals/snacks/supplements    Anthropometric Measurements  Monitoring and Evaluation Plan: Body weight  Body Weight: Body weight - Promote weight restoration         Physical Exam Findings  Monitoring and Evaluation Plan: Digestive System  Digestive System Finding: Abdominal pain, Nausea  Criteria: to resolve    Goal Status: New goal(s) identified    Time Spent (min): 60 minutes         "

## 2025-02-27 NOTE — CARE PLAN
Problem: Pain - Adult  Goal: Verbalizes/displays adequate comfort level or baseline comfort level  Outcome: Progressing     Problem: Safety - Adult  Goal: Free from fall injury  Outcome: Progressing     Problem: Discharge Planning  Goal: Discharge to home or other facility with appropriate resources  Outcome: Progressing     Problem: Chronic Conditions and Co-morbidities  Goal: Patient's chronic conditions and co-morbidity symptoms are monitored and maintained or improved  Outcome: Progressing     Problem: Nutrition  Goal: Nutrient intake appropriate for maintaining nutritional needs  Outcome: Progressing     Problem: Fall/Injury  Goal: Not fall by end of shift  Outcome: Progressing   The patient's goals for the shift include      The clinical goals for the shift include n/a

## 2025-02-27 NOTE — CARE PLAN
The patient's goals for the shift include      The clinical goals for the shift include tolerate prescribed diet

## 2025-02-27 NOTE — PROGRESS NOTES
Spiritual Care Visit  Spiritual Care Request    Reason for Visit:  Routine Visit: Introduction     Request Received From:       Focus of Care:  Visited With: Patient         Refer to :          Spiritual Care Assessment    Spiritual Assessment:                      Care Provided:       Sense of Community and or Muslim Affiliation:  Christian         Addressed Needs/Concerns and/or Jennifer Through:          Outcome:        Advance Directives:  Advance Directives  Advance Directives Reviewed Date: 02/27/25  Comment: Patient a Jehovah Witness. Lacks capacity      Spiritual Care Annotation    Annotation:  Jw's usually already have AD's

## 2025-02-27 NOTE — CONSULTS
Reason For Consult  Nausea, abdominal pain, weight loss    History Of Present Illness  Diomedes Taylor is a 74 y.o. female with PMH of HTN, CAD, dementia presenting from [facility] office due to hypotension. She had presented to her appointment with ongoing epigastric discomfort, nausea. Epigastric pain has been periodic over the past 7 to 8 months. She feels that this causes the nausea which is most bothersome to her. She feels as if she has to vomit but does not.  She endorses increased belching. She currently has prescriptions for pantoprazole 40 mg, simethicone and Zofran at home but may have difficulties remembering to take medications per her brother who provided history at for outpatient appointment. GI plan yesterday was to take pantoprazole 40 mg daily, Zofran every morning for nausea, Pepcid 20 mg nightly, plan to schedule for EGD.  Add boost or Ensure 2-3 times daily to increase caloric intake, consider peppermint and nuzhat teas.    Pt also recently had COVID 3 weeks ago treated with Paxlovid and symptoms have increased. She continues to endorse fatigue, weakness, dizziness.  She is noted to have had a 30 pound weight loss since September, she believes secondary to decreased intake d/t nausea. She denies constipation, diarrhea, hematochezia or melena, though she does endorse a small hard balls of stool at times.    Patient with no fever, leukocytosis or BECKY on arrival.  CT noting large stool burden. Slight mucosal thickening with minimal [place] colonic haziness of the distal  descending colon at the junction with the sigmoid colon which could  be secondary to diverticulitis.    She had previously been seen in July 2022, also with nausea.  At that time she underwent EGD and colonoscopy noting tortuous colon otherwise unremarkable findings.       Past Medical History  She has a past medical history of Anxiety and depression (01/24/2021), Coronary artery disease involving native coronary  "artery of native heart without angina pectoris (02/17/2021), Dementia, GERD (gastroesophageal reflux disease), HTN (hypertension) (03/23/2024), Hyperlipidemia, Major depression, recurrent, chronic (CMS-HCC) (10/10/2023), and RBBB.  PVD aortic atherosclerosis, peripheral neuropathy    Surgical History  Hysterectomy, EGD, colonoscopy with polypectomy     Social History  She reports that she has never smoked. She has never used smokeless tobacco. She reports that she does not currently use alcohol. She reports that she does not use drugs.    Family History  No known GI malignancies, father-diverticulitis    Review of Systems  ROS negative unless stated otherwise in HPI     Objective  /56   Pulse 60   Temp 36 °C (96.8 °F)   Resp 16   Ht 1.676 m (5' 6\")   Wt 48.5 kg (107 lb)   SpO2 96%   BMI 17.27 kg/m²     Physical Exam  Constitutional: Alert, pleasant and interactive thin, elderly female, in NAD  Eyes: PERRL, sclera clear, no conjunctival injection  Skin: Warm and dry, no rash or ecchymosis  ENMT: Mucous membranes moist, no lesions noted  Resp: CTAB, even and unlabored  CV: RRR, normal S1, S2, no m,r,g  GI: +BS, soft, round, epigastric TTP, no rebound tenderness or guarding, no palpable masses or organomegaly  Extremities: Extremities warm, no edema, contusions, wounds or cyanosis  Neuro: Alert and oriented x1  Psych: Appropriate mood and behavior    Medications  Scheduled medications  buPROPion, 75 mg, oral, BID  cefTRIAXone, 2 g, intravenous, q24h  cyanocobalamin, 1,000 mcg, oral, Daily  donepezil, 5 mg, oral, Nightly  famotidine, 20 mg, oral, Nightly  [Held by provider] lisinopril, 2.5 mg, oral, Daily  metroNIDAZOLE, 500 mg, intravenous, Once  metroNIDAZOLE, 500 mg, intravenous, q8h  pantoprazole, 40 mg, oral, Daily before breakfast  polyethylene glycol, 17 g, oral, Daily  sennosides-docusate sodium, 2 tablet, oral, BID  sertraline, 100 mg, oral, Daily  simvastatin, 40 mg, oral, Nightly      Continuous " "medications  lactated Ringer's, 75 mL/hr, Last Rate: 75 mL/hr (02/27/25 0935)      PRN medications  PRN medications: acetaminophen, melatonin, ondansetron **OR** ondansetron     Labs  Lab Results   Component Value Date    WBC 4.0 (L) 02/27/2025    HGB 10.3 (L) 02/27/2025    HCT 32.5 (L) 02/27/2025    MCV 95 02/27/2025     (L) 02/27/2025     Lab Results   Component Value Date    GLUCOSE 81 02/27/2025    CALCIUM 8.7 02/27/2025     02/27/2025    K 3.9 02/27/2025    CO2 32 02/27/2025     02/27/2025    BUN 21 02/27/2025    CREATININE 1.04 02/27/2025     Lab Results   Component Value Date    ALT 10 02/26/2025    AST 14 02/26/2025    ALKPHOS 50 02/26/2025    BILITOT 0.4 02/26/2025     No results found for: \"IRON\", \"TIBC\", \"FERRITIN\"  Lab Results   Component Value Date    INR 1.1 02/26/2025    INR 1.1 03/23/2024    PROTIME 12.2 02/26/2025    PROTIME 12.6 03/23/2024     Radiology  CT A/P with IV contrast 2/26/2025 noting:  IMPRESSION:  1. Stable 4 mm right lower lobe pulmonary nodule compared to  02/05/2025.  2. Small pericardial effusion, also unchanged compared to the  02/05/2025 study.  3. Questionable changes of diverticulitis at the junction of the  sigmoid colon with the distal descending colon within the left iliac  fossa.  4. Probable constipation.    Signed by: Lee Massey 2/26/2025 3:28 PM  Dictation workstation:   [id number]    Assessment:  Diomedes Taylor is a 74 y.o. female with PMH of HTN, CAD, dementia (A&O x 1) presenting from outpatient GI appointment where she presented with ongoing epigastric discomfort and nausea. She was sent to ED for hypotension which has improved with IVF. Symptoms are ongoing resulting in ~30 pound weight loss in the past 5 months. Imaging noting large stool burden, colonic mucosal thickening. Otherwise no significant lab findings today.    # epigastric pain  # nausea  # large stool burden  # colonic mucosal thickening  # normocytic anemia- stable    Plan:  - " continue supportive care  - clear liquids advance as tolerated  - continue IVF's until taking adequate oral fluids   - continue analgesics and antiemetics as needed  - continue miralax 17 gm BID, senna 2 tabs BID  - avoid narcotics or other meds that may worsen constipation  - continue home PPI 40 mg daily, Zofran, Pepcid  - pt will be scheduled for outpatient EGD Monday with Dr. Can    Thank you for allowing us to participate in care. Please call with any further questions or concerns.      Plan has been discussed with Dr. Can. GI will sign off.     Kayli Bianchi, APRN/CNP

## 2025-02-27 NOTE — H&P
Diomedes Taylor  02/26/25  [id number]  Iban Fletcher MD  This is a patient with a past medical history as detailed below admitted to the [facility] ED with chief complaint of      Assessment and plan   1-  2-  3-  4-  Discussed with the ED /consultants.      Review of other systems negative other than HPI  Past medical history    Epic& consultants notes reviewed  Most recent images Reviewed  Last EKG/Rhythm reviewed      Vital signs has been reviewed  GENERAL: o x 3 in distress.   SKIN:  No rashes .   ENT mucosa,  Normal/nose normal   NECK: no jugulovenous distention  NO lymphadenopathy   LUNGS:No wheezing,no ronchi  no rales.  CARDIAC:  S1 and S2  ABDOMEN: Abdomen soft, non-tender.    EXTREMITIES: Extremities normal.   NEURO: non focal    PULSES: + pedal / radial   No edema  No goiter   No carotid bruits.                   Past Medical History  She has a past medical history of Anxiety and depression (01/24/2021), Coronary artery disease involving native coronary artery of native heart without angina pectoris (02/17/2021), Dementia, GERD (gastroesophageal reflux disease), HTN (hypertension) (03/23/2024), Hyperlipidemia, Major depression, recurrent, chronic (CMS-HCC) (10/10/2023), and RBBB.    Surgical History  She has a past surgical history that includes Hysterectomy; Esophagogastroduodenoscopy; and Colonoscopy w/ polypectomy.     Social History  She reports that she has never smoked. She has never used smokeless tobacco. She reports that she does not currently use alcohol. She reports that she does not use drugs.    Family History  Family History   Problem Relation Name Age of Onset    Dementia Mother      Coronary artery disease Mother      Dementia Father      Diverticulitis Father          Allergies  Patient has no known allergies.    Review of Systems     Physical Exam     Last Recorded Vitals  /73 (BP Location: Right arm, Patient Position: Sitting)   Pulse 62   Temp 37 °C (98.6 °F) (Tympanic)   Resp 18    Wt 48.5 kg (107 lb)   SpO2 99%     Relevant Results  {If you would like to pull in Medications, type .meds     If you would like to pull in Lab results for the last 24 hours, type .[id number]    If you would like to pull in Imaging results, type .[id number] :99}      ***     Assessment/Plan   Assessment & Plan  Diverticulitis  -Started on ceftriaxone and metronidazole in the emergency department, continue  -Clear liquid diet   -GI consult  -Tova-Colace and MiraLAX for constipation seen on CT  Anxiety and depression  -Continue home bupropion and sertraline  Dementia  -Continue home Aricept  Hypertension  -Hold home lisinopril due to hypotension  Acute kidney injury (CMS-HCC)  -Baseline creatinine 0.7-0.9, creatinine currently 1.58  -IVF  -Monitor BMP  Hypokalemia  -Potassium 3.1-replaced in ED  -Monitor BMP  Unintentional weight loss  Weight 9/9/24 135 lb  Weight 2/3/25 114 lb  Weight 2/26/25 107 lb  28 lb weight loss last 5 months    ***       Iban Fletcher MD

## 2025-03-05 LAB
ATRIAL RATE: 59 BPM
P AXIS: 54 DEGREES
P OFFSET: 176 MS
P ONSET: 132 MS
PR INTERVAL: 180 MS
Q ONSET: 222 MS
QRS COUNT: 10 BEATS
QRS DURATION: 134 MS
QT INTERVAL: 460 MS
QTC CALCULATION(BAZETT): 455 MS
QTC FREDERICIA: 457 MS
R AXIS: 59 DEGREES
T AXIS: 30 DEGREES
T OFFSET: 452 MS
VENTRICULAR RATE: 59 BPM

## 2025-06-24 ENCOUNTER — CLINICAL SUPPORT (OUTPATIENT)
Dept: NEUROLOGY | Facility: CLINIC | Age: 75
End: 2025-06-24
Payer: COMMERCIAL